# Patient Record
Sex: FEMALE | Race: WHITE | NOT HISPANIC OR LATINO
[De-identification: names, ages, dates, MRNs, and addresses within clinical notes are randomized per-mention and may not be internally consistent; named-entity substitution may affect disease eponyms.]

---

## 2017-06-28 ENCOUNTER — OTHER (OUTPATIENT)
Age: 37
End: 2017-06-28

## 2017-06-28 ENCOUNTER — APPOINTMENT (OUTPATIENT)
Dept: INTERNAL MEDICINE | Facility: CLINIC | Age: 37
End: 2017-06-28

## 2017-06-28 VITALS
WEIGHT: 106 LBS | HEART RATE: 57 BPM | SYSTOLIC BLOOD PRESSURE: 90 MMHG | BODY MASS INDEX: 18.32 KG/M2 | HEIGHT: 63.75 IN | OXYGEN SATURATION: 100 % | DIASTOLIC BLOOD PRESSURE: 60 MMHG | TEMPERATURE: 98.4 F

## 2017-06-28 DIAGNOSIS — Z87.09 PERSONAL HISTORY OF OTHER DISEASES OF THE RESPIRATORY SYSTEM: ICD-10-CM

## 2017-06-28 DIAGNOSIS — Z56.0 UNEMPLOYMENT, UNSPECIFIED: ICD-10-CM

## 2017-06-28 DIAGNOSIS — M54.5 LOW BACK PAIN: ICD-10-CM

## 2017-06-28 DIAGNOSIS — G89.29 LOW BACK PAIN: ICD-10-CM

## 2017-06-28 DIAGNOSIS — R10.30 LOWER ABDOMINAL PAIN, UNSPECIFIED: ICD-10-CM

## 2017-06-28 DIAGNOSIS — Z98.82 BREAST IMPLANT STATUS: ICD-10-CM

## 2017-06-28 LAB
BILIRUB UR QL STRIP: NORMAL
GLUCOSE UR-MCNC: NORMAL
HCG UR QL: 0.2 EU/DL
HGB UR QL STRIP.AUTO: NORMAL
KETONES UR-MCNC: NORMAL
LEUKOCYTE ESTERASE UR QL STRIP: NORMAL
NITRITE UR QL STRIP: NORMAL
PH UR STRIP: 6
PROT UR STRIP-MCNC: NORMAL
SP GR UR STRIP: 1.02

## 2017-06-28 SDOH — ECONOMIC STABILITY - INCOME SECURITY: UNEMPLOYMENT, UNSPECIFIED: Z56.0

## 2017-06-30 LAB
25(OH)D3 SERPL-MCNC: 61.4 NG/ML
ALBUMIN SERPL ELPH-MCNC: 4.5 G/DL
ALP BLD-CCNC: 36 U/L
ALT SERPL-CCNC: 10 U/L
ANION GAP SERPL CALC-SCNC: 15 MMOL/L
APPEARANCE: CLEAR
AST SERPL-CCNC: 14 U/L
BACTERIA UR CULT: NORMAL
BACTERIA: NEGATIVE
BASOPHILS # BLD AUTO: 0.01 K/UL
BASOPHILS NFR BLD AUTO: 0.2 %
BILIRUB SERPL-MCNC: 0.4 MG/DL
BILIRUBIN URINE: NEGATIVE
BLOOD URINE: ABNORMAL
BUN SERPL-MCNC: 18 MG/DL
CALCIUM SERPL-MCNC: 9.6 MG/DL
CHLORIDE SERPL-SCNC: 103 MMOL/L
CHOLEST SERPL-MCNC: 200 MG/DL
CHOLEST/HDLC SERPL: 2.2 RATIO
CO2 SERPL-SCNC: 22 MMOL/L
COLOR: YELLOW
CREAT SERPL-MCNC: 0.75 MG/DL
EOSINOPHIL # BLD AUTO: 0.05 K/UL
EOSINOPHIL NFR BLD AUTO: 0.9 %
GLUCOSE QUALITATIVE U: NORMAL MG/DL
GLUCOSE SERPL-MCNC: 76 MG/DL
HBA1C MFR BLD HPLC: 5 %
HCT VFR BLD CALC: 41.2 %
HDLC SERPL-MCNC: 93 MG/DL
HGB BLD-MCNC: 12.8 G/DL
HYALINE CASTS: 0 /LPF
IMM GRANULOCYTES NFR BLD AUTO: 0.2 %
IRON SERPL-MCNC: 80 UG/DL
KETONES URINE: NEGATIVE
LDLC SERPL CALC-MCNC: 96 MG/DL
LEUKOCYTE ESTERASE URINE: NEGATIVE
LYMPHOCYTES # BLD AUTO: 1.33 K/UL
LYMPHOCYTES NFR BLD AUTO: 24.6 %
MAN DIFF?: NORMAL
MCHC RBC-ENTMCNC: 30.3 PG
MCHC RBC-ENTMCNC: 31.1 GM/DL
MCV RBC AUTO: 97.6 FL
MICROSCOPIC-UA: NORMAL
MONOCYTES # BLD AUTO: 0.43 K/UL
MONOCYTES NFR BLD AUTO: 7.9 %
NEUTROPHILS # BLD AUTO: 3.58 K/UL
NEUTROPHILS NFR BLD AUTO: 66.2 %
NITRITE URINE: NEGATIVE
PH URINE: 6
PLATELET # BLD AUTO: 267 K/UL
POTASSIUM SERPL-SCNC: 4.4 MMOL/L
PROT SERPL-MCNC: 7.4 G/DL
PROTEIN URINE: NEGATIVE MG/DL
RBC # BLD: 4.22 M/UL
RBC # FLD: 13.6 %
RED BLOOD CELLS URINE: 5 /HPF
SODIUM SERPL-SCNC: 140 MMOL/L
SPECIFIC GRAVITY URINE: 1.02
SQUAMOUS EPITHELIAL CELLS: 2 /HPF
TRIGL SERPL-MCNC: 55 MG/DL
TSH SERPL-ACNC: 0.82 UIU/ML
URINE COMMENTS: NORMAL
UROBILINOGEN URINE: NORMAL MG/DL
WBC # FLD AUTO: 5.41 K/UL
WHITE BLOOD CELLS URINE: 0 /HPF

## 2017-07-13 ENCOUNTER — APPOINTMENT (OUTPATIENT)
Dept: ORTHOPEDIC SURGERY | Facility: CLINIC | Age: 37
End: 2017-07-13

## 2017-07-13 VITALS
BODY MASS INDEX: 18.38 KG/M2 | HEART RATE: 66 BPM | SYSTOLIC BLOOD PRESSURE: 113 MMHG | WEIGHT: 105 LBS | HEIGHT: 63.5 IN | DIASTOLIC BLOOD PRESSURE: 71 MMHG

## 2017-07-13 DIAGNOSIS — M54.5 LOW BACK PAIN: ICD-10-CM

## 2017-07-25 ENCOUNTER — FORM ENCOUNTER (OUTPATIENT)
Age: 37
End: 2017-07-25

## 2017-07-26 ENCOUNTER — APPOINTMENT (OUTPATIENT)
Dept: MRI IMAGING | Facility: CLINIC | Age: 37
End: 2017-07-26

## 2017-07-26 ENCOUNTER — OUTPATIENT (OUTPATIENT)
Dept: OUTPATIENT SERVICES | Facility: HOSPITAL | Age: 37
LOS: 1 days | End: 2017-07-26
Payer: COMMERCIAL

## 2017-07-26 DIAGNOSIS — R42 DIZZINESS AND GIDDINESS: ICD-10-CM

## 2017-07-26 PROCEDURE — 70551 MRI BRAIN STEM W/O DYE: CPT

## 2017-08-17 ENCOUNTER — APPOINTMENT (OUTPATIENT)
Dept: MRI IMAGING | Facility: IMAGING CENTER | Age: 37
End: 2017-08-17

## 2017-10-05 ENCOUNTER — OTHER (OUTPATIENT)
Age: 37
End: 2017-10-05

## 2017-10-05 DIAGNOSIS — M79.89 OTHER SPECIFIED SOFT TISSUE DISORDERS: ICD-10-CM

## 2017-10-24 ENCOUNTER — APPOINTMENT (OUTPATIENT)
Dept: INTERNAL MEDICINE | Facility: CLINIC | Age: 37
End: 2017-10-24
Payer: COMMERCIAL

## 2017-10-24 VITALS
OXYGEN SATURATION: 98 % | DIASTOLIC BLOOD PRESSURE: 64 MMHG | BODY MASS INDEX: 18.9 KG/M2 | TEMPERATURE: 98.2 F | WEIGHT: 108 LBS | HEART RATE: 63 BPM | SYSTOLIC BLOOD PRESSURE: 104 MMHG | HEIGHT: 63.5 IN

## 2017-10-24 LAB — S PYO AG SPEC QL IA: NORMAL

## 2017-10-24 PROCEDURE — 87880 STREP A ASSAY W/OPTIC: CPT | Mod: QW

## 2017-10-24 PROCEDURE — 99213 OFFICE O/P EST LOW 20 MIN: CPT

## 2018-03-27 ENCOUNTER — APPOINTMENT (OUTPATIENT)
Dept: INTERNAL MEDICINE | Facility: CLINIC | Age: 38
End: 2018-03-27
Payer: COMMERCIAL

## 2018-03-27 VITALS
SYSTOLIC BLOOD PRESSURE: 90 MMHG | OXYGEN SATURATION: 98 % | TEMPERATURE: 99.1 F | WEIGHT: 108 LBS | BODY MASS INDEX: 19.14 KG/M2 | HEART RATE: 85 BPM | HEIGHT: 63 IN | DIASTOLIC BLOOD PRESSURE: 54 MMHG

## 2018-03-27 DIAGNOSIS — H92.03 OTALGIA, BILATERAL: ICD-10-CM

## 2018-03-27 DIAGNOSIS — Z87.09 PERSONAL HISTORY OF OTHER DISEASES OF THE RESPIRATORY SYSTEM: ICD-10-CM

## 2018-03-27 DIAGNOSIS — J03.90 ACUTE TONSILLITIS, UNSPECIFIED: ICD-10-CM

## 2018-03-27 LAB — S PYO AG SPEC QL IA: NORMAL

## 2018-03-27 PROCEDURE — 87880 STREP A ASSAY W/OPTIC: CPT | Mod: QW

## 2018-03-27 PROCEDURE — 99213 OFFICE O/P EST LOW 20 MIN: CPT

## 2018-03-27 RX ORDER — AZITHROMYCIN 250 MG/1
250 TABLET, FILM COATED ORAL
Qty: 1 | Refills: 0 | Status: DISCONTINUED | COMMUNITY
Start: 2017-10-24 | End: 2018-03-27

## 2018-05-29 ENCOUNTER — OUTPATIENT (OUTPATIENT)
Dept: OUTPATIENT SERVICES | Facility: HOSPITAL | Age: 38
LOS: 1 days | End: 2018-05-29
Payer: COMMERCIAL

## 2018-05-29 DIAGNOSIS — Z3A.00 WEEKS OF GESTATION OF PREGNANCY NOT SPECIFIED: ICD-10-CM

## 2018-05-29 DIAGNOSIS — O26.899 OTHER SPECIFIED PREGNANCY RELATED CONDITIONS, UNSPECIFIED TRIMESTER: ICD-10-CM

## 2018-05-29 LAB
APPEARANCE UR: CLEAR — SIGNIFICANT CHANGE UP
BACTERIA # UR AUTO: SIGNIFICANT CHANGE UP
BILIRUB UR-MCNC: NEGATIVE — SIGNIFICANT CHANGE UP
BLOOD UR QL VISUAL: HIGH
COLOR SPEC: SIGNIFICANT CHANGE UP
GLUCOSE UR-MCNC: NEGATIVE — SIGNIFICANT CHANGE UP
KETONES UR-MCNC: NEGATIVE — SIGNIFICANT CHANGE UP
LEUKOCYTE ESTERASE UR-ACNC: HIGH
MUCOUS THREADS # UR AUTO: SIGNIFICANT CHANGE UP
NITRITE UR-MCNC: NEGATIVE — SIGNIFICANT CHANGE UP
NON-SQ EPI CELLS # UR AUTO: <1 — SIGNIFICANT CHANGE UP
PH UR: 7.5 — SIGNIFICANT CHANGE UP (ref 4.6–8)
PROT UR-MCNC: NEGATIVE MG/DL — SIGNIFICANT CHANGE UP
RBC CASTS # UR COMP ASSIST: SIGNIFICANT CHANGE UP (ref 0–?)
SP GR SPEC: 1.01 — SIGNIFICANT CHANGE UP (ref 1–1.04)
SQUAMOUS # UR AUTO: SIGNIFICANT CHANGE UP
UROBILINOGEN FLD QL: NORMAL MG/DL — SIGNIFICANT CHANGE UP
WBC UR QL: SIGNIFICANT CHANGE UP (ref 0–?)

## 2018-05-29 PROCEDURE — 76815 OB US LIMITED FETUS(S): CPT | Mod: 26

## 2018-05-29 PROCEDURE — 76817 TRANSVAGINAL US OBSTETRIC: CPT | Mod: 26

## 2018-05-29 PROCEDURE — 99213 OFFICE O/P EST LOW 20 MIN: CPT | Mod: 25

## 2018-05-29 RX ORDER — NITROFURANTOIN MACROCRYSTAL 50 MG
1 CAPSULE ORAL
Qty: 14 | Refills: 0 | OUTPATIENT
Start: 2018-05-29 | End: 2018-06-04

## 2018-05-31 ENCOUNTER — TRANSCRIPTION ENCOUNTER (OUTPATIENT)
Age: 38
End: 2018-05-31

## 2018-05-31 ENCOUNTER — INPATIENT (INPATIENT)
Facility: HOSPITAL | Age: 38
LOS: 13 days | Discharge: HOME | End: 2018-06-14
Attending: OBSTETRICS & GYNECOLOGY | Admitting: OBSTETRICS & GYNECOLOGY
Payer: COMMERCIAL

## 2018-05-31 ENCOUNTER — OUTPATIENT (OUTPATIENT)
Dept: OUTPATIENT SERVICES | Facility: HOSPITAL | Age: 38
LOS: 1 days | End: 2018-05-31

## 2018-05-31 ENCOUNTER — APPOINTMENT (OUTPATIENT)
Dept: ANTEPARTUM | Facility: HOSPITAL | Age: 38
End: 2018-05-31
Payer: COMMERCIAL

## 2018-05-31 ENCOUNTER — ASOB RESULT (OUTPATIENT)
Age: 38
End: 2018-05-31

## 2018-05-31 VITALS
DIASTOLIC BLOOD PRESSURE: 62 MMHG | RESPIRATION RATE: 16 BRPM | SYSTOLIC BLOOD PRESSURE: 111 MMHG | HEART RATE: 101 BPM | TEMPERATURE: 99 F | OXYGEN SATURATION: 99 %

## 2018-05-31 VITALS
SYSTOLIC BLOOD PRESSURE: 110 MMHG | RESPIRATION RATE: 17 BRPM | OXYGEN SATURATION: 98 % | TEMPERATURE: 98 F | DIASTOLIC BLOOD PRESSURE: 56 MMHG | HEART RATE: 91 BPM

## 2018-05-31 VITALS — WEIGHT: 116.84 LBS | HEIGHT: 63 IN

## 2018-05-31 DIAGNOSIS — Z3A.00 WEEKS OF GESTATION OF PREGNANCY NOT SPECIFIED: ICD-10-CM

## 2018-05-31 DIAGNOSIS — O26.899 OTHER SPECIFIED PREGNANCY RELATED CONDITIONS, UNSPECIFIED TRIMESTER: ICD-10-CM

## 2018-05-31 DIAGNOSIS — O26.90 PREGNANCY RELATED CONDITIONS, UNSPECIFIED, UNSPECIFIED TRIMESTER: ICD-10-CM

## 2018-05-31 LAB
APPEARANCE UR: CLEAR — SIGNIFICANT CHANGE UP
B PERT DNA SPEC QL NAA+PROBE: SIGNIFICANT CHANGE UP
BACTERIA # UR AUTO: SIGNIFICANT CHANGE UP
BACTERIA UR CULT: SIGNIFICANT CHANGE UP
BASOPHILS # BLD AUTO: 0.01 K/UL — SIGNIFICANT CHANGE UP (ref 0–0.2)
BASOPHILS # BLD AUTO: 0.02 K/UL — SIGNIFICANT CHANGE UP (ref 0–0.2)
BASOPHILS NFR BLD AUTO: 0.1 % — SIGNIFICANT CHANGE UP (ref 0–2)
BASOPHILS NFR BLD AUTO: 0.2 % — SIGNIFICANT CHANGE UP (ref 0–2)
BILIRUB UR-MCNC: NEGATIVE — SIGNIFICANT CHANGE UP
BLD GP AB SCN SERPL QL: NEGATIVE — SIGNIFICANT CHANGE UP
BLOOD UR QL VISUAL: HIGH
C PNEUM DNA SPEC QL NAA+PROBE: NOT DETECTED — SIGNIFICANT CHANGE UP
COLOR SPEC: YELLOW — SIGNIFICANT CHANGE UP
EOSINOPHIL # BLD AUTO: 0.02 K/UL — SIGNIFICANT CHANGE UP (ref 0–0.5)
EOSINOPHIL # BLD AUTO: 0.02 K/UL — SIGNIFICANT CHANGE UP (ref 0–0.5)
EOSINOPHIL NFR BLD AUTO: 0.2 % — SIGNIFICANT CHANGE UP (ref 0–6)
EOSINOPHIL NFR BLD AUTO: 0.2 % — SIGNIFICANT CHANGE UP (ref 0–6)
FLUAV H1 2009 PAND RNA SPEC QL NAA+PROBE: NOT DETECTED — SIGNIFICANT CHANGE UP
FLUAV H1 RNA SPEC QL NAA+PROBE: NOT DETECTED — SIGNIFICANT CHANGE UP
FLUAV H3 RNA SPEC QL NAA+PROBE: NOT DETECTED — SIGNIFICANT CHANGE UP
FLUAV SUBTYP SPEC NAA+PROBE: SIGNIFICANT CHANGE UP
FLUBV RNA SPEC QL NAA+PROBE: NOT DETECTED — SIGNIFICANT CHANGE UP
GLUCOSE UR-MCNC: NEGATIVE — SIGNIFICANT CHANGE UP
HADV DNA SPEC QL NAA+PROBE: NOT DETECTED — SIGNIFICANT CHANGE UP
HCOV 229E RNA SPEC QL NAA+PROBE: NOT DETECTED — SIGNIFICANT CHANGE UP
HCOV HKU1 RNA SPEC QL NAA+PROBE: NOT DETECTED — SIGNIFICANT CHANGE UP
HCOV NL63 RNA SPEC QL NAA+PROBE: NOT DETECTED — SIGNIFICANT CHANGE UP
HCOV OC43 RNA SPEC QL NAA+PROBE: NOT DETECTED — SIGNIFICANT CHANGE UP
HCT VFR BLD CALC: 26.7 % — LOW (ref 34.5–45)
HCT VFR BLD CALC: 28.8 % — LOW (ref 34.5–45)
HGB BLD-MCNC: 9.4 G/DL — LOW (ref 11.5–15.5)
HGB BLD-MCNC: 9.9 G/DL — LOW (ref 11.5–15.5)
HMPV RNA SPEC QL NAA+PROBE: NOT DETECTED — SIGNIFICANT CHANGE UP
HPIV1 RNA SPEC QL NAA+PROBE: NOT DETECTED — SIGNIFICANT CHANGE UP
HPIV2 RNA SPEC QL NAA+PROBE: NOT DETECTED — SIGNIFICANT CHANGE UP
HPIV3 RNA SPEC QL NAA+PROBE: NOT DETECTED — SIGNIFICANT CHANGE UP
HPIV4 RNA SPEC QL NAA+PROBE: NOT DETECTED — SIGNIFICANT CHANGE UP
IMM GRANULOCYTES # BLD AUTO: 0.07 # — SIGNIFICANT CHANGE UP
IMM GRANULOCYTES # BLD AUTO: 0.08 # — SIGNIFICANT CHANGE UP
IMM GRANULOCYTES NFR BLD AUTO: 0.6 % — SIGNIFICANT CHANGE UP (ref 0–1.5)
IMM GRANULOCYTES NFR BLD AUTO: 0.6 % — SIGNIFICANT CHANGE UP (ref 0–1.5)
KETONES UR-MCNC: NEGATIVE — SIGNIFICANT CHANGE UP
LACTATE SERPL-SCNC: 0.6 MMOL/L — SIGNIFICANT CHANGE UP (ref 0.5–2)
LACTATE SERPL-SCNC: 0.7 MMOL/L — SIGNIFICANT CHANGE UP (ref 0.5–2)
LEUKOCYTE ESTERASE UR-ACNC: HIGH
LYMPHOCYTES # BLD AUTO: 0.53 K/UL — LOW (ref 1–3.3)
LYMPHOCYTES # BLD AUTO: 0.68 K/UL — LOW (ref 1–3.3)
LYMPHOCYTES # BLD AUTO: 4.5 % — LOW (ref 13–44)
LYMPHOCYTES # BLD AUTO: 5.1 % — LOW (ref 13–44)
M PNEUMO DNA SPEC QL NAA+PROBE: NOT DETECTED — SIGNIFICANT CHANGE UP
MCHC RBC-ENTMCNC: 30.6 PG — SIGNIFICANT CHANGE UP (ref 27–34)
MCHC RBC-ENTMCNC: 31.2 PG — SIGNIFICANT CHANGE UP (ref 27–34)
MCHC RBC-ENTMCNC: 34.4 % — SIGNIFICANT CHANGE UP (ref 32–36)
MCHC RBC-ENTMCNC: 35.2 % — SIGNIFICANT CHANGE UP (ref 32–36)
MCV RBC AUTO: 87 FL — SIGNIFICANT CHANGE UP (ref 80–100)
MCV RBC AUTO: 90.9 FL — SIGNIFICANT CHANGE UP (ref 80–100)
MONOCYTES # BLD AUTO: 0.73 K/UL — SIGNIFICANT CHANGE UP (ref 0–0.9)
MONOCYTES # BLD AUTO: 0.77 K/UL — SIGNIFICANT CHANGE UP (ref 0–0.9)
MONOCYTES NFR BLD AUTO: 5.8 % — SIGNIFICANT CHANGE UP (ref 2–14)
MONOCYTES NFR BLD AUTO: 6.2 % — SIGNIFICANT CHANGE UP (ref 2–14)
MUCOUS THREADS # UR AUTO: SIGNIFICANT CHANGE UP
NEUTROPHILS # BLD AUTO: 10.47 K/UL — HIGH (ref 1.8–7.4)
NEUTROPHILS # BLD AUTO: 11.64 K/UL — HIGH (ref 1.8–7.4)
NEUTROPHILS NFR BLD AUTO: 88.1 % — HIGH (ref 43–77)
NEUTROPHILS NFR BLD AUTO: 88.4 % — HIGH (ref 43–77)
NITRITE UR-MCNC: NEGATIVE — SIGNIFICANT CHANGE UP
NON-SQ EPI CELLS # UR AUTO: <1 — SIGNIFICANT CHANGE UP
NRBC # FLD: 0 — SIGNIFICANT CHANGE UP
NRBC # FLD: 0 — SIGNIFICANT CHANGE UP
PH UR: 7 — SIGNIFICANT CHANGE UP (ref 4.6–8)
PLATELET # BLD AUTO: 186 K/UL — SIGNIFICANT CHANGE UP (ref 150–400)
PLATELET # BLD AUTO: 194 K/UL — SIGNIFICANT CHANGE UP (ref 150–400)
PMV BLD: 10 FL — SIGNIFICANT CHANGE UP (ref 7–13)
PMV BLD: 10.2 FL — SIGNIFICANT CHANGE UP (ref 7–13)
PROT UR-MCNC: NEGATIVE MG/DL — SIGNIFICANT CHANGE UP
RBC # BLD: 3.07 M/UL — LOW (ref 3.8–5.2)
RBC # BLD: 3.17 M/UL — LOW (ref 3.8–5.2)
RBC # FLD: 11.9 % — SIGNIFICANT CHANGE UP (ref 10.3–14.5)
RBC # FLD: 12.2 % — SIGNIFICANT CHANGE UP (ref 10.3–14.5)
RBC CASTS # UR COMP ASSIST: HIGH (ref 0–?)
RH IG SCN BLD-IMP: POSITIVE — SIGNIFICANT CHANGE UP
RSV RNA SPEC QL NAA+PROBE: NOT DETECTED — SIGNIFICANT CHANGE UP
RV+EV RNA SPEC QL NAA+PROBE: NOT DETECTED — SIGNIFICANT CHANGE UP
SP GR SPEC: 1.01 — SIGNIFICANT CHANGE UP (ref 1–1.04)
SPECIMEN SOURCE: SIGNIFICANT CHANGE UP
SQUAMOUS # UR AUTO: SIGNIFICANT CHANGE UP
UROBILINOGEN FLD QL: NORMAL MG/DL — SIGNIFICANT CHANGE UP
WBC # BLD: 11.83 K/UL — HIGH (ref 3.8–10.5)
WBC # BLD: 13.21 K/UL — HIGH (ref 3.8–10.5)
WBC # FLD AUTO: 11.83 K/UL — HIGH (ref 3.8–10.5)
WBC # FLD AUTO: 13.21 K/UL — HIGH (ref 3.8–10.5)
WBC UR QL: HIGH (ref 0–?)

## 2018-05-31 PROCEDURE — 76817 TRANSVAGINAL US OBSTETRIC: CPT | Mod: 26

## 2018-05-31 PROCEDURE — 71045 X-RAY EXAM CHEST 1 VIEW: CPT | Mod: 26

## 2018-05-31 PROCEDURE — 76805 OB US >/= 14 WKS SNGL FETUS: CPT | Mod: 26

## 2018-05-31 RX ORDER — DOCUSATE SODIUM 100 MG
100 CAPSULE ORAL
Qty: 0 | Refills: 0 | Status: DISCONTINUED | OUTPATIENT
Start: 2018-05-31 | End: 2018-05-31

## 2018-05-31 RX ORDER — AMOXICILLIN 250 MG/5ML
1 SUSPENSION, RECONSTITUTED, ORAL (ML) ORAL
Qty: 14 | Refills: 0 | OUTPATIENT
Start: 2018-05-31 | End: 2018-06-06

## 2018-05-31 RX ORDER — METHENAMINE MANDELATE 1 G
0 TABLET ORAL
Qty: 0 | Refills: 0 | COMMUNITY
Start: 2018-05-31

## 2018-05-31 RX ORDER — AMPICILLIN TRIHYDRATE 250 MG
2 CAPSULE ORAL EVERY 4 HOURS
Qty: 0 | Refills: 0 | Status: DISCONTINUED | OUTPATIENT
Start: 2018-05-31 | End: 2018-05-31

## 2018-05-31 RX ORDER — SODIUM CHLORIDE 9 MG/ML
1000 INJECTION, SOLUTION INTRAVENOUS ONCE
Qty: 0 | Refills: 0 | Status: COMPLETED | OUTPATIENT
Start: 2018-05-31 | End: 2018-05-31

## 2018-05-31 RX ORDER — PIPERACILLIN AND TAZOBACTAM 4; .5 G/20ML; G/20ML
3.38 INJECTION, POWDER, LYOPHILIZED, FOR SOLUTION INTRAVENOUS ONCE
Qty: 0 | Refills: 0 | Status: COMPLETED | OUTPATIENT
Start: 2018-05-31 | End: 2018-05-31

## 2018-05-31 RX ORDER — SODIUM CHLORIDE 9 MG/ML
1000 INJECTION, SOLUTION INTRAVENOUS
Qty: 0 | Refills: 0 | Status: DISCONTINUED | OUTPATIENT
Start: 2018-05-31 | End: 2018-05-31

## 2018-05-31 RX ORDER — ACETAMINOPHEN 500 MG
650 TABLET ORAL EVERY 6 HOURS
Qty: 0 | Refills: 0 | Status: DISCONTINUED | OUTPATIENT
Start: 2018-05-31 | End: 2018-05-31

## 2018-05-31 RX ORDER — PIPERACILLIN AND TAZOBACTAM 4; .5 G/20ML; G/20ML
3.38 INJECTION, POWDER, LYOPHILIZED, FOR SOLUTION INTRAVENOUS EVERY 8 HOURS
Qty: 0 | Refills: 0 | Status: DISCONTINUED | OUTPATIENT
Start: 2018-05-31 | End: 2018-06-13

## 2018-05-31 RX ADMIN — Medication 650 MILLIGRAM(S): at 10:04

## 2018-05-31 RX ADMIN — Medication 650 MILLIGRAM(S): at 03:11

## 2018-05-31 RX ADMIN — Medication 650 MILLIGRAM(S): at 03:41

## 2018-05-31 RX ADMIN — Medication 216 GRAM(S): at 06:44

## 2018-05-31 RX ADMIN — Medication 216 GRAM(S): at 02:55

## 2018-05-31 RX ADMIN — Medication 650 MILLIGRAM(S): at 10:30

## 2018-05-31 RX ADMIN — PIPERACILLIN AND TAZOBACTAM 25 GRAM(S): 4; .5 INJECTION, POWDER, LYOPHILIZED, FOR SOLUTION INTRAVENOUS at 20:00

## 2018-05-31 RX ADMIN — Medication 216 GRAM(S): at 11:18

## 2018-05-31 RX ADMIN — SODIUM CHLORIDE 250 MILLILITER(S): 9 INJECTION, SOLUTION INTRAVENOUS at 02:57

## 2018-05-31 RX ADMIN — SODIUM CHLORIDE 2000 MILLILITER(S): 9 INJECTION, SOLUTION INTRAVENOUS at 20:00

## 2018-05-31 NOTE — DISCHARGE NOTE ANTEPARTUM - CARE PROVIDER_API CALL
Pascual Roman (AJ), MaternalFetal Medicine; Obstetrics and Gynecology  [address] [address] [place] [address]  [address]  [place] [address]  Phone: [phone]  Fax: [phone]

## 2018-05-31 NOTE — DISCHARGE NOTE ANTEPARTUM - PATIENT PORTAL LINK FT
You can access the [facility] Patient Portal, offered by [facility], by registering with the following website: [email or web address]

## 2018-05-31 NOTE — DISCHARGE NOTE ANTEPARTUM - HOSPITAL COURSE
36 yo  at 17w1d a/w fevers at home. Patient has hx of sepsis in previous pregnancies. She was not complaining of any other symptoms. She was afebrile throughout admission. Received ampicillin for antibiotic coverage. She was discharged home on HD#2 with followup as outpatient. CL was 2.7 on ATU sono. She will receive Gerald[place] weekly. Followup with OB as scheduled.

## 2018-05-31 NOTE — DISCHARGE NOTE ANTEPARTUM - MEDICATION SUMMARY - MEDICATIONS TO TAKE
I will START or STAY ON the medications listed below when I get home from the hospital:  None I will START or STAY ON the medications listed below when I get home from the hospital:    amoxicillin 875 mg oral tablet  -- 1 tab(s) by mouth every 12 hours   -- Finish all this medication unless otherwise directed by prescriber.    -- Indication: For Fever of unknown origin (FUO)

## 2018-05-31 NOTE — DISCHARGE NOTE ANTEPARTUM - PLAN OF CARE
Continue prenatal care Followup with OB as scheduled. Continue [place] weekly. Call office with any concerns.

## 2018-06-01 ENCOUNTER — TRANSCRIPTION ENCOUNTER (OUTPATIENT)
Age: 38
End: 2018-06-01

## 2018-06-01 LAB
AMNISURE ROM (RUPTURE OF MEMBRANES): NEGATIVE — SIGNIFICANT CHANGE UP
BACTERIA UR CULT: SIGNIFICANT CHANGE UP
BASOPHILS # BLD AUTO: 0.02 K/UL — SIGNIFICANT CHANGE UP (ref 0–0.2)
BASOPHILS NFR BLD AUTO: 0.2 % — SIGNIFICANT CHANGE UP (ref 0–2)
EOSINOPHIL # BLD AUTO: 0.02 K/UL — SIGNIFICANT CHANGE UP (ref 0–0.5)
EOSINOPHIL NFR BLD AUTO: 0.2 % — SIGNIFICANT CHANGE UP (ref 0–6)
HCT VFR BLD CALC: 25.8 % — LOW (ref 34.5–45)
HGB BLD-MCNC: 8.9 G/DL — LOW (ref 11.5–15.5)
IMM GRANULOCYTES # BLD AUTO: 0.06 # — SIGNIFICANT CHANGE UP
IMM GRANULOCYTES NFR BLD AUTO: 0.6 % — SIGNIFICANT CHANGE UP (ref 0–1.5)
LYMPHOCYTES # BLD AUTO: 0.76 K/UL — LOW (ref 1–3.3)
LYMPHOCYTES # BLD AUTO: 7.7 % — LOW (ref 13–44)
MCHC RBC-ENTMCNC: 30.5 PG — SIGNIFICANT CHANGE UP (ref 27–34)
MCHC RBC-ENTMCNC: 34.5 % — SIGNIFICANT CHANGE UP (ref 32–36)
MCV RBC AUTO: 88.4 FL — SIGNIFICANT CHANGE UP (ref 80–100)
METHOD TYPE: SIGNIFICANT CHANGE UP
MONOCYTES # BLD AUTO: 0.74 K/UL — SIGNIFICANT CHANGE UP (ref 0–0.9)
MONOCYTES NFR BLD AUTO: 7.5 % — SIGNIFICANT CHANGE UP (ref 2–14)
NEUTROPHILS # BLD AUTO: 8.22 K/UL — HIGH (ref 1.8–7.4)
NEUTROPHILS NFR BLD AUTO: 83.8 % — HIGH (ref 43–77)
NRBC # FLD: 0 — SIGNIFICANT CHANGE UP
ORGANISM # SPEC MICROSCOPIC CNT: SIGNIFICANT CHANGE UP
ORGANISM # SPEC MICROSCOPIC CNT: SIGNIFICANT CHANGE UP
PLATELET # BLD AUTO: 150 K/UL — SIGNIFICANT CHANGE UP (ref 150–400)
PMV BLD: 10.2 FL — SIGNIFICANT CHANGE UP (ref 7–13)
RBC # BLD: 2.92 M/UL — LOW (ref 3.8–5.2)
RBC # FLD: 12.2 % — SIGNIFICANT CHANGE UP (ref 10.3–14.5)
SPECIMEN SOURCE: SIGNIFICANT CHANGE UP
WBC # BLD: 9.82 K/UL — SIGNIFICANT CHANGE UP (ref 3.8–10.5)
WBC # FLD AUTO: 9.82 K/UL — SIGNIFICANT CHANGE UP (ref 3.8–10.5)

## 2018-06-01 PROCEDURE — 99254 IP/OBS CNSLTJ NEW/EST MOD 60: CPT | Mod: GC

## 2018-06-01 RX ORDER — ALPRAZOLAM 0.25 MG
0.25 TABLET ORAL ONCE
Qty: 0 | Refills: 0 | Status: DISCONTINUED | OUTPATIENT
Start: 2018-06-01 | End: 2018-06-02

## 2018-06-01 RX ORDER — ACETAMINOPHEN 500 MG
975 TABLET ORAL ONCE
Qty: 0 | Refills: 0 | Status: COMPLETED | OUTPATIENT
Start: 2018-06-01 | End: 2018-06-01

## 2018-06-01 RX ORDER — DOCUSATE SODIUM 100 MG
100 CAPSULE ORAL
Qty: 0 | Refills: 0 | Status: DISCONTINUED | OUTPATIENT
Start: 2018-06-01 | End: 2018-06-14

## 2018-06-01 RX ADMIN — PIPERACILLIN AND TAZOBACTAM 25 GRAM(S): 4; .5 INJECTION, POWDER, LYOPHILIZED, FOR SOLUTION INTRAVENOUS at 14:31

## 2018-06-01 RX ADMIN — PIPERACILLIN AND TAZOBACTAM 25 GRAM(S): 4; .5 INJECTION, POWDER, LYOPHILIZED, FOR SOLUTION INTRAVENOUS at 04:14

## 2018-06-01 RX ADMIN — Medication 1 TABLET(S): at 12:08

## 2018-06-01 RX ADMIN — Medication 975 MILLIGRAM(S): at 02:06

## 2018-06-01 RX ADMIN — Medication 975 MILLIGRAM(S): at 01:36

## 2018-06-01 RX ADMIN — PIPERACILLIN AND TAZOBACTAM 25 GRAM(S): 4; .5 INJECTION, POWDER, LYOPHILIZED, FOR SOLUTION INTRAVENOUS at 21:57

## 2018-06-01 NOTE — CONSULT NOTE ADULT - SUBJECTIVE AND OBJECTIVE BOX
Consultation Requested by:    Patient is a 37y old  Female who presents with a chief complaint of fevers in pregnancy (2018 14:58)    HPI: 37/F  currently 17 weeks pregnant comes to [facility] complaining of fever, lower abdominal cramping, and pink vaginal discharge that previously was yellow. She has a complicated history with her first pregnancy in  ending in fetal demise after  birth and she was found to be bacteremic with GBS and required vancomycin via PICC x 2 weeks. She became pregnant again  and successfully gave birth to  child however was found to be bacteremic with bacteroides and was treated with meropenem. She again was pregnant  via IVF and had baby girl with no complications other than  birth. She is currently 17 weeks pregnant with another baby girl and has been having intermittent fevers at 11:00/6:00 regularly up to 102F. She had some to [facility] Tuesday but was discharged home with amoxicillin. Prior to that she was on macrobid for UTI. She returned to hospital as she continued to have fevers. Patient takes colace and prune juice for constipation. She had been told in the past she has severe constipation that causes her abdominal pain during pregnancy. She has no other medical problems. She had colonoscopy 2.5 years ago with removal of 3 polyps. She currently takes progesterone injections for cervical insufficiency but has no skin infections at injection site.     REVIEW OF SYSTEMS  General: no fever, no chills  Ophthalmologic: no change in vision  ENMT: no sore throat  Respiratory and Thorax: no SOB, no cough  Cardiovascular: no CP  Gastrointestinal: no abd pain, N/V/D  Genitourinary: lower abd cramps  Musculoskeletal: no joint pain  Neurological: no HA  Psychiatric: no anxiety/depression  Hematology/Lymphatics: no abnormal bleeding  Endocrine: no changes in weight    Recent Ill Contacts:	[x] No	[] Yes:  Recent Travel History:	[x] No	[] Yes:  Recent Animal/Insect Exposure/Tick Bites:	[x] No	[] Yes:    Allergies    Kefzol (Hives; Rash; Chills)    Intolerances      Antimicrobials:  piperacillin/tazobactam IVPB. 3.375 Gram(s) IV Intermittent every 8 hours      Other Medications:  docusate sodium 100 milliGRAM(s) Oral two times a day  prenatal multivitamin 1 Tablet(s) Oral daily      FAMILY HISTORY:    PAST MEDICAL & SURGICAL HISTORY:    SOCIAL HISTORY:    IMMUNIZATIONS  [] Up to Date		[] Not Up to Date:  Recent Immunizations:	[] No	[] Yes:        PHYSICAL EXAM  Vital Signs Last 24 Hrs  T(C): 37.3 (2018 12:12), Max: 37.7 (2018 00:30)  T(F): 99.1 (2018 12:12), Max: 99.8 (2018 00:30)  HR: 80 (2018 12:12) (63 - 92)  BP: 107/65 (2018 12:12) (90/55 - 110/62)  BP(mean): --  RR: 18 (2018 12:12) (16 - 18)  SpO2: 99% (2018 12:12) (96% - 100%)    Constitutional: no acute distress  Eyes: clear conjunctiva  ENMT: moist oral mucosa  Respiratory: CTA b/l  Cardiovascular: S1, S2, RR  Gastrointestinal: abd distended 2/2 pregnancy. Non tender.   Genitourinary: no CVA tenderness. Declined further exam.   Extremities: no calf tenderness, no leg edema  Neurological: AAO x 4  Skin: no rashes        Respiratory Support:		[x] No	[] Yes:  Vasoactive medication infusion:	[x] No	[] Yes:  Venous catheters:		[] No	[x] Yes:  Bladder catheter:		[x] No	[] Yes:  Other catheters or tubes:	[x] No	[] Yes:    Lab Results:                        8.9    9.82  )-----------( 150      ( 2018 06:09 )             25.8               Urinalysis Basic - ( 31 May 2018 01:36 )    Color: YELLOW / Appearance: CLEAR / S.007 / pH: 7.0  Gluc: NEGATIVE / Ketone: NEGATIVE  / Bili: NEGATIVE / Urobili: NORMAL mg/dL   Blood: MODERATE / Protein: NEGATIVE mg/dL / Nitrite: NEGATIVE   Leuk Esterase: MODERATE / RBC: 5-10 / WBC 5-10   Sq Epi: OCC / Non Sq Epi: x / Bacteria: FEW        MICROBIOLOGY    [] Pathology slides reviewed and/or discussed with pathologist  [] Microbiology findings discussed with microbiologist or slides reviewed  [] Images erviewed with radiologist  [] Case discussed with an attending physician in addition to the patient's primary physician  [] Records, reports from outside [facility] reviewed    [] Patient requires continued monitoring for:  [] Total critical care time spent by attending physician: __ minutes, excluding procedure time. Consultation Requested by:    Patient is a 37y old  Female who presents with a chief complaint of fevers in pregnancy (2018 14:58)    HPI: 37/F  currently 17 weeks pregnant comes to [facility] complaining of fever, lower abdominal cramping, and pink vaginal discharge that previously was yellow. She has a complicated history with her first pregnancy in  ending in fetal demise after  birth and she was found to be bacteremic with GBS and required vancomycin via PICC x 2 weeks. She became pregnant again  and successfully gave birth to  child however was found to be bacteremic with bacteroides and was treated with meropenem. She again was pregnant  via IVF and had baby girl with no complications other than  birth. She is currently 17 weeks pregnant with another baby girl and has been having intermittent fevers at 11:00/6:00 regularly up to 102F. She had some to [facility] Tuesday but was discharged home with amoxicillin. Prior to that she was on macrobid for UTI. She returned to hospital as she continued to have fevers. Patient takes colace and prune juice for constipation. She had been told in the past she has severe constipation that causes her abdominal pain during pregnancy. She has no other medical problems. She had colonoscopy 2.5 years ago with removal of 3 polyps. She currently takes progesterone injections for cervical insufficiency but has no skin infections at injection site.     REVIEW OF SYSTEMS  General: no fever, no chills  Ophthalmologic: no change in vision  ENMT: no sore throat  Respiratory and Thorax: no SOB, no cough  Cardiovascular: no CP  Gastrointestinal: no abd pain, N/V/D  Genitourinary: lower abd cramps  Musculoskeletal: no joint pain  Neurological: no HA  Psychiatric: no anxiety/depression  Hematology/Lymphatics: no abnormal bleeding  Endocrine: no changes in weight    Recent Ill Contacts:	[x] No	[] Yes:  Recent Travel History:	[x] No	[] Yes:  Recent Animal/Insect Exposure/Tick Bites:	[x] No	[] Yes:    Allergies    Kefzol (Hives; Rash; Chills)    Intolerances      Antimicrobials:  piperacillin/tazobactam IVPB. 3.375 Gram(s) IV Intermittent every 8 hours      Other Medications:  docusate sodium 100 milliGRAM(s) Oral two times a day  prenatal multivitamin 1 Tablet(s) Oral daily      FAMILY HISTORY:    PAST MEDICAL & SURGICAL HISTORY:    SOCIAL HISTORY:    IMMUNIZATIONS  [] Up to Date		[] Not Up to Date:  Recent Immunizations:	[] No	[] Yes:        PHYSICAL EXAM  Vital Signs Last 24 Hrs  T(C): 37.3 (2018 12:12), Max: 37.7 (2018 00:30)  T(F): 99.1 (2018 12:12), Max: 99.8 (2018 00:30)  HR: 80 (2018 12:12) (63 - 92)  BP: 107/65 (2018 12:12) (90/55 - 110/62)  BP(mean): --  RR: 18 (2018 12:12) (16 - 18)  SpO2: 99% (2018 12:12) (96% - 100%)    Constitutional: no acute distress  Eyes: clear conjunctiva  ENMT: moist oral mucosa  Respiratory: CTA b/l  Cardiovascular: S1, S2, RR  Gastrointestinal: abd distended 2/2 pregnancy. Non tender.   Genitourinary: no CVA tenderness. Declined further exam.   Extremities: no calf tenderness, no leg edema  Neurological: AAO x 4  Skin: no rashes        Respiratory Support:		[x] No	[] Yes:  Vasoactive medication infusion:	[x] No	[] Yes:  Venous catheters:		[] No	[x] Yes:  Bladder catheter:		[x] No	[] Yes:  Other catheters or tubes:	[x] No	[] Yes:    Lab Results:                        8.9    9.82  )-----------( 150      ( 2018 06:09 )             25.8               Urinalysis Basic - ( 31 May 2018 01:36 )    Color: YELLOW / Appearance: CLEAR / S.007 / pH: 7.0  Gluc: NEGATIVE / Ketone: NEGATIVE  / Bili: NEGATIVE / Urobili: NORMAL mg/dL   Blood: MODERATE / Protein: NEGATIVE mg/dL / Nitrite: NEGATIVE   Leuk Esterase: MODERATE / RBC: 5-10 / WBC 5-10   Sq Epi: OCC / Non Sq Epi: x / Bacteria: FEW        MICROBIOLOGY    RVP neg  blood culture x 2 testing  urine culture neg x 2

## 2018-06-01 NOTE — DISCHARGE NOTE ANTEPARTUM - CARE PROVIDER_API CALL
Pascual Roman (AJ), MaternalFetal Medicine; Obstetrics and Gynecology  [address] [address] [place] [address]  [address]  [place] [address]  Phone: [phone]  Fax: [phone]

## 2018-06-01 NOTE — CONSULT NOTE ADULT - ATTENDING COMMENTS
Concern for chorioamnionitis in woman  who is 17 weeks pregnant with fever, lower abdominal crampy pain.    Blood culture +   await gram stain, identification.    would continue zosyn  follow blood culture identification and susceptibility    ID service will follow over weekend

## 2018-06-01 NOTE — DISCHARGE NOTE ANTEPARTUM - CARE PLAN
Principal Discharge DX:	Fever and chills  Goal:	Continue prenatal care  Assessment and plan of treatment:	F/u as outpatient with OB. Return for fevers, contraction, LOF. Take [place] weekly as prescribed. Principal Discharge DX:	Fever and chills  Goal:	Continue prenatal care  Assessment and plan of treatment:	F/u as outpatient with OB and Infectious disease. Return for fevers, contraction, LOF. Take [place] weekly as prescribed.

## 2018-06-01 NOTE — DISCHARGE NOTE ANTEPARTUM - PATIENT PORTAL LINK FT
You can access the [facility] Patient Portal, offered by [facility], by registering with the following website: [email or web address]

## 2018-06-01 NOTE — DISCHARGE NOTE ANTEPARTUM - MEDICATION SUMMARY - MEDICATIONS TO TAKE
I will START or STAY ON the medications listed below when I get home from the hospital:    amoxicillin 500 mg oral tablet  -- 1 tab(s) by mouth every 12 hours   -- Finish all this medication unless otherwise directed by prescriber.    -- Indication: For Infection    HYDROXYprogesterone 250 mg/mL intramuscular solution  -- 1 milliliter(s) intramuscular every 7 days  -- Indication: For Vag P

## 2018-06-01 NOTE — DISCHARGE NOTE ANTEPARTUM - PLAN OF CARE
Continue prenatal care F/u as outpatient with OB. Return for fevers, contraction, LOF. Take [place] weekly as prescribed. F/u as outpatient with OB and Infectious disease. Return for fevers, contraction, LOF. Take [place] weekly as prescribed.

## 2018-06-01 NOTE — CONSULT NOTE ADULT - ASSESSMENT
37/F  currently 17 weeks pregnant comes to [facility] with fevers with infectious work up negative thus far other than UA concerning for possible UTI however negative urine cultures, RVP, blood cultures and CXR WNL.     #Fever  -given cultures are negative, CXR clear, RVP negative no other localizing infectious signs remaining possible source of infection is chorioamnionitis however patient without leukocytosis > 15, bandemia, purulent cervical discharge (per patient). Patient had continued to have fevers up until last night. She is reluctant to have amniocentesis   -continue with zosyn for now; this should cover past infections with GBS, bacteroides.   -continue to follow blood and urine cultures      Yamila Johnson, PGY 2  Internal Medicine  Pager [phone] | [phone]

## 2018-06-01 NOTE — DISCHARGE NOTE ANTEPARTUM - HOSPITAL COURSE
38 yo  at 17w1d a/w fevers at home. Patient had been admitted for subjective fevers previously and discharged. On the day of discharge (5/31), patient went home and had fevers at home so came back. Patient was febrile at presentation to 38.4. CXR and RVP were negative. WBC and lactate were normal. Blood cultures and urine cultures were taken, which grew _____. Patient was started on Zosyn at admission. ID was consulted.       At time of discharge, pt was ambulating, tolerating PO and afebrile. Patient to f/u with Dr. Roman as outpatient. 38 yo  at 17w1d a/w fevers at home. Patient had been admitted for subjective fevers previously and discharged. On the day of discharge (5/31), patient went home and had fevers at home so came back. Patient was febrile at presentation to 38.4. CXR and RVP were negative. WBC and lactate were normal. Blood cultures and urine cultures were taken, which grew PEPTONIPHILUS ASACCHAROLYTICUS  .Patient was started on Zosyn at admission. ID was consulted. She remained on Zosyn for 14 days and was switchd to Amoxicillin 500 BID. Sonogram revealed no fluid on 6/13.   Please call to schedule an appointment in 2 weeks with Dr. Roman. Follow up with Infectious Disease.

## 2018-06-02 DIAGNOSIS — R50.9 FEVER, UNSPECIFIED: ICD-10-CM

## 2018-06-02 DIAGNOSIS — O26.899 OTHER SPECIFIED PREGNANCY RELATED CONDITIONS, UNSPECIFIED TRIMESTER: ICD-10-CM

## 2018-06-02 LAB
ALBUMIN SERPL ELPH-MCNC: 3.1 G/DL — LOW (ref 3.3–5)
ALP SERPL-CCNC: 154 U/L — HIGH (ref 40–120)
ALT FLD-CCNC: 25 U/L — SIGNIFICANT CHANGE UP (ref 4–33)
AMYLASE P1 CFR SERPL: 27 U/L — SIGNIFICANT CHANGE UP (ref 25–125)
APTT BLD: 30.4 SEC — SIGNIFICANT CHANGE UP (ref 27.5–37.4)
AST SERPL-CCNC: 23 U/L — SIGNIFICANT CHANGE UP (ref 4–32)
BASOPHILS # BLD AUTO: 0.02 K/UL — SIGNIFICANT CHANGE UP (ref 0–0.2)
BASOPHILS NFR BLD AUTO: 0.2 % — SIGNIFICANT CHANGE UP (ref 0–2)
BILIRUB SERPL-MCNC: < 0.2 MG/DL — LOW (ref 0.2–1.2)
BUN SERPL-MCNC: 7 MG/DL — SIGNIFICANT CHANGE UP (ref 7–23)
CALCIUM SERPL-MCNC: 8.6 MG/DL — SIGNIFICANT CHANGE UP (ref 8.4–10.5)
CHLORIDE SERPL-SCNC: 97 MMOL/L — LOW (ref 98–107)
CO2 SERPL-SCNC: 23 MMOL/L — SIGNIFICANT CHANGE UP (ref 22–31)
CREAT SERPL-MCNC: 0.52 MG/DL — SIGNIFICANT CHANGE UP (ref 0.5–1.3)
EOSINOPHIL # BLD AUTO: 0.04 K/UL — SIGNIFICANT CHANGE UP (ref 0–0.5)
EOSINOPHIL NFR BLD AUTO: 0.4 % — SIGNIFICANT CHANGE UP (ref 0–6)
FIBRINOGEN PPP-MCNC: 693.3 MG/DL — HIGH (ref 310–510)
GLUCOSE SERPL-MCNC: 85 MG/DL — SIGNIFICANT CHANGE UP (ref 70–99)
HCT VFR BLD CALC: 27.6 % — LOW (ref 34.5–45)
HGB BLD-MCNC: 9.5 G/DL — LOW (ref 11.5–15.5)
IMM GRANULOCYTES # BLD AUTO: 0.06 # — SIGNIFICANT CHANGE UP
IMM GRANULOCYTES NFR BLD AUTO: 0.5 % — SIGNIFICANT CHANGE UP (ref 0–1.5)
INR BLD: 0.96 — SIGNIFICANT CHANGE UP (ref 0.88–1.17)
LACTATE SERPL-SCNC: 0.7 MMOL/L — SIGNIFICANT CHANGE UP (ref 0.5–2)
LIDOCAIN IGE QN: 23.9 U/L — SIGNIFICANT CHANGE UP (ref 7–60)
LYMPHOCYTES # BLD AUTO: 1.08 K/UL — SIGNIFICANT CHANGE UP (ref 1–3.3)
LYMPHOCYTES # BLD AUTO: 9.6 % — LOW (ref 13–44)
MCHC RBC-ENTMCNC: 30.2 PG — SIGNIFICANT CHANGE UP (ref 27–34)
MCHC RBC-ENTMCNC: 34.4 % — SIGNIFICANT CHANGE UP (ref 32–36)
MCV RBC AUTO: 87.6 FL — SIGNIFICANT CHANGE UP (ref 80–100)
MONOCYTES # BLD AUTO: 1.02 K/UL — HIGH (ref 0–0.9)
MONOCYTES NFR BLD AUTO: 9.1 % — SIGNIFICANT CHANGE UP (ref 2–14)
NEUTROPHILS # BLD AUTO: 9.01 K/UL — HIGH (ref 1.8–7.4)
NEUTROPHILS NFR BLD AUTO: 80.2 % — HIGH (ref 43–77)
NRBC # FLD: 0 — SIGNIFICANT CHANGE UP
ORGANISM # SPEC MICROSCOPIC CNT: SIGNIFICANT CHANGE UP
PLATELET # BLD AUTO: 176 K/UL — SIGNIFICANT CHANGE UP (ref 150–400)
PMV BLD: 10.2 FL — SIGNIFICANT CHANGE UP (ref 7–13)
POTASSIUM SERPL-MCNC: 3.7 MMOL/L — SIGNIFICANT CHANGE UP (ref 3.5–5.3)
POTASSIUM SERPL-SCNC: 3.7 MMOL/L — SIGNIFICANT CHANGE UP (ref 3.5–5.3)
PROT SERPL-MCNC: 6.1 G/DL — SIGNIFICANT CHANGE UP (ref 6–8.3)
PROTHROM AB SERPL-ACNC: 11 SEC — SIGNIFICANT CHANGE UP (ref 9.8–13.1)
RBC # BLD: 3.15 M/UL — LOW (ref 3.8–5.2)
RBC # FLD: 12.4 % — SIGNIFICANT CHANGE UP (ref 10.3–14.5)
SODIUM SERPL-SCNC: 132 MMOL/L — LOW (ref 135–145)
WBC # BLD: 11.23 K/UL — HIGH (ref 3.8–10.5)
WBC # FLD AUTO: 11.23 K/UL — HIGH (ref 3.8–10.5)

## 2018-06-02 PROCEDURE — 99232 SBSQ HOSP IP/OBS MODERATE 35: CPT

## 2018-06-02 RX ORDER — FLUCONAZOLE 150 MG/1
150 TABLET ORAL ONCE
Qty: 0 | Refills: 0 | Status: COMPLETED | OUTPATIENT
Start: 2018-06-02 | End: 2018-06-02

## 2018-06-02 RX ORDER — VANCOMYCIN HCL 1 G
1250 VIAL (EA) INTRAVENOUS EVERY 12 HOURS
Qty: 0 | Refills: 0 | Status: DISCONTINUED | OUTPATIENT
Start: 2018-06-02 | End: 2018-06-04

## 2018-06-02 RX ORDER — HEPARIN SODIUM 5000 [USP'U]/ML
5000 INJECTION INTRAVENOUS; SUBCUTANEOUS EVERY 12 HOURS
Qty: 0 | Refills: 0 | Status: DISCONTINUED | OUTPATIENT
Start: 2018-06-02 | End: 2018-06-14

## 2018-06-02 RX ADMIN — Medication 0.25 MILLIGRAM(S): at 21:41

## 2018-06-02 RX ADMIN — Medication 100 MILLIGRAM(S): at 05:48

## 2018-06-02 RX ADMIN — HEPARIN SODIUM 5000 UNIT(S): 5000 INJECTION INTRAVENOUS; SUBCUTANEOUS at 18:01

## 2018-06-02 RX ADMIN — PIPERACILLIN AND TAZOBACTAM 25 GRAM(S): 4; .5 INJECTION, POWDER, LYOPHILIZED, FOR SOLUTION INTRAVENOUS at 05:51

## 2018-06-02 RX ADMIN — Medication 1 TABLET(S): at 11:48

## 2018-06-02 RX ADMIN — PIPERACILLIN AND TAZOBACTAM 25 GRAM(S): 4; .5 INJECTION, POWDER, LYOPHILIZED, FOR SOLUTION INTRAVENOUS at 14:29

## 2018-06-02 RX ADMIN — FLUCONAZOLE 150 MILLIGRAM(S): 150 TABLET ORAL at 05:48

## 2018-06-02 RX ADMIN — PIPERACILLIN AND TAZOBACTAM 25 GRAM(S): 4; .5 INJECTION, POWDER, LYOPHILIZED, FOR SOLUTION INTRAVENOUS at 21:41

## 2018-06-02 RX ADMIN — Medication 166.67 MILLIGRAM(S): at 11:48

## 2018-06-02 NOTE — PROGRESS NOTE ADULT - ATTENDING COMMENTS
Mark Mukherjee  Pager: [phone]. If no response or past 5 pm call [phone].     Paged OB resident, awaiting call back. Mark Mukherjee  Pager: [phone]. If no response or past 5 pm call [phone].

## 2018-06-02 NOTE — PROGRESS NOTE ADULT - SUBJECTIVE AND OBJECTIVE BOX
37y old  Female who presents with a chief complaint of fevers in pregnancy       Interval history:  Febrile overnight, says she had a panic attack overnight, having pinkish vaginal discharge, some cough, no abdominal pain, occasional nausea. She says if they have to terminate this baby she is mentally prepared, worried about the bacteremia.       Allergies:   Kefzol (Hives; Rash; Chills)      Antimicrobials:  piperacillin/tazobactam IVPB. 3.375 Gram(s) IV Intermittent every 8 hours  vancomycin  IVPB 1250 milliGRAM(s) IV Intermittent every 12 hours      REVIEW OF SYSTEMS:  No chest pain or palpitations  No SOB  No dysuria   No rash.     Vital Signs Last 24 Hrs  T(C): 36.7 (06-02-18 @ 09:16), Max: 38.7 (06-01-18 @ 22:09)  T(F): 98 (06-02-18 @ 09:16), Max: 101.6 (06-01-18 @ 22:09)  HR: 81 (06-02-18 @ 09:16) (80 - 102)  BP: 114/66 (06-02-18 @ 09:16) (95/61 - 132/71)  RR: 18 (06-02-18 @ 09:16) (16 - 19)  SpO2: 99% (06-02-18 @ 09:16) (98% - 100%)      PHYSICAL EXAM:  Patient in no acute distress. AAOX3. Tearful.   Cardiovascular: S1S2 normal.   Lungs: Good air entry B/L lung fields.  Gastrointestinal: soft, nondistended, gravid uterus.   Extremities: no edema.  IV sites not inflamed.                           9.5    11.23 )-----------( 176      ( 02 Jun 2018 06:00 )             27.6   06-02    132<L>  |  97<L>  |  7   ----------------------------<  85  3.7   |  23  |  0.52    Ca    8.6      02 Jun 2018 06:00    TPro  6.1  /  Alb  3.1<L>  /  TBili  < 0.2<L>  /  DBili  x   /  AST  23  /  ALT  25  /  AlkPhos  154<H>  06-02      LIVER FUNCTIONS - ( 02 Jun 2018 06:00 )  Alb: 3.1 g/dL / Pro: 6.1 g/dL / ALK PHOS: 154 u/L / ALT: 25 u/L / AST: 23 u/L / GGT: x             Culture - Blood (collected 31 May 2018 20:45)  Source: BLOOD VENOUS  Preliminary Report (01 Jun 2018 20:45):    NO ORGANISMS ISOLATED    NO ORGANISMS ISOLATED AT 24 HOURS      Culture - Blood (collected 31 May 2018 20:45)  Source: BLOOD PERIPHERAL  Preliminary Report (01 Jun 2018 20:45):    NO ORGANISMS ISOLATED    NO ORGANISMS ISOLATED AT 24 HOURS      Culture - Blood (collected 31 May 2018 04:14)  Source: BLOOD PERIPHERAL  Preliminary Report (01 Jun 2018 04:14):    NO ORGANISMS ISOLATED    NO ORGANISMS ISOLATED AT 24 HOURS      Culture - Blood (collected 31 May 2018 04:14)  Source: BLOOD PERIPHERAL  Preliminary Report (02 Jun 2018 07:24):    CULTURE IN PROGRESS, FURTHER REPORT TO FOLLOW.  GPC in clusters.       Culture - Urine (collected 31 May 2018 02:09)  Source: URINE MIDSTREAM  Final Report (01 Jun 2018 08:26):    NO GROWTH AT 24 HOURS

## 2018-06-02 NOTE — PROGRESS NOTE ADULT - ASSESSMENT
37/F  currently 17 weeks pregnant comes to [facility] with fevers.  Sepsis (fever, tachycardia, bandemia) secondary to GPC in clusters bacteremia likely due to chorioamnionitis.   Afebrile today.       Plan:   Continue with Zosyn at current dose.  Added Vancomycin 1250 mg iv q12h, slow infusion.  Check trough prior to 4th dose.  Follow up final identification of GPC   Follow up prelim blood cx, NTD.   Urine cx negative. 37/F  currently 17 weeks pregnant comes to [facility] with fevers.  Sepsis (fever, tachycardia, bandemia) secondary to GPC in clusters bacteremia likely due to chorioamnionitis.   Afebrile today.       Plan:   Continue with Zosyn at current dose.  Added Vancomycin 1250 mg iv q12h, slow infusion.  Check trough prior to 4th dose.  Follow up final identification of GPC   Follow up prelim blood cx, NTD.   Urine cx negative.   Consider abdominal sono if feasible.

## 2018-06-03 LAB
BASOPHILS # BLD AUTO: 0.02 K/UL — SIGNIFICANT CHANGE UP (ref 0–0.2)
BASOPHILS NFR BLD AUTO: 0.2 % — SIGNIFICANT CHANGE UP (ref 0–2)
EOSINOPHIL # BLD AUTO: 0.07 K/UL — SIGNIFICANT CHANGE UP (ref 0–0.5)
EOSINOPHIL NFR BLD AUTO: 0.7 % — SIGNIFICANT CHANGE UP (ref 0–6)
HCT VFR BLD CALC: 26.5 % — LOW (ref 34.5–45)
HGB BLD-MCNC: 9.3 G/DL — LOW (ref 11.5–15.5)
IMM GRANULOCYTES # BLD AUTO: 0.04 # — SIGNIFICANT CHANGE UP
IMM GRANULOCYTES NFR BLD AUTO: 0.4 % — SIGNIFICANT CHANGE UP (ref 0–1.5)
LYMPHOCYTES # BLD AUTO: 1.36 K/UL — SIGNIFICANT CHANGE UP (ref 1–3.3)
LYMPHOCYTES # BLD AUTO: 13.4 % — SIGNIFICANT CHANGE UP (ref 13–44)
MCHC RBC-ENTMCNC: 30.7 PG — SIGNIFICANT CHANGE UP (ref 27–34)
MCHC RBC-ENTMCNC: 35.1 % — SIGNIFICANT CHANGE UP (ref 32–36)
MCV RBC AUTO: 87.5 FL — SIGNIFICANT CHANGE UP (ref 80–100)
METHOD TYPE: SIGNIFICANT CHANGE UP
MONOCYTES # BLD AUTO: 1.28 K/UL — HIGH (ref 0–0.9)
MONOCYTES NFR BLD AUTO: 12.6 % — SIGNIFICANT CHANGE UP (ref 2–14)
NEUTROPHILS # BLD AUTO: 7.38 K/UL — SIGNIFICANT CHANGE UP (ref 1.8–7.4)
NEUTROPHILS NFR BLD AUTO: 72.7 % — SIGNIFICANT CHANGE UP (ref 43–77)
NRBC # FLD: 0 — SIGNIFICANT CHANGE UP
ORGANISM # SPEC MICROSCOPIC CNT: SIGNIFICANT CHANGE UP
ORGANISM # SPEC MICROSCOPIC CNT: SIGNIFICANT CHANGE UP
PLATELET # BLD AUTO: 196 K/UL — SIGNIFICANT CHANGE UP (ref 150–400)
PMV BLD: 10.3 FL — SIGNIFICANT CHANGE UP (ref 7–13)
RBC # BLD: 3.03 M/UL — LOW (ref 3.8–5.2)
RBC # FLD: 12.5 % — SIGNIFICANT CHANGE UP (ref 10.3–14.5)
WBC # BLD: 10.15 K/UL — SIGNIFICANT CHANGE UP (ref 3.8–10.5)
WBC # FLD AUTO: 10.15 K/UL — SIGNIFICANT CHANGE UP (ref 3.8–10.5)

## 2018-06-03 RX ORDER — ACETAMINOPHEN 500 MG
650 TABLET ORAL ONCE
Qty: 0 | Refills: 0 | Status: COMPLETED | OUTPATIENT
Start: 2018-06-03 | End: 2018-06-03

## 2018-06-03 RX ADMIN — PIPERACILLIN AND TAZOBACTAM 25 GRAM(S): 4; .5 INJECTION, POWDER, LYOPHILIZED, FOR SOLUTION INTRAVENOUS at 14:21

## 2018-06-03 RX ADMIN — Medication 1 TABLET(S): at 12:18

## 2018-06-03 RX ADMIN — Medication 650 MILLIGRAM(S): at 02:00

## 2018-06-03 RX ADMIN — Medication 166.67 MILLIGRAM(S): at 12:16

## 2018-06-03 RX ADMIN — Medication 650 MILLIGRAM(S): at 01:34

## 2018-06-03 RX ADMIN — PIPERACILLIN AND TAZOBACTAM 25 GRAM(S): 4; .5 INJECTION, POWDER, LYOPHILIZED, FOR SOLUTION INTRAVENOUS at 22:17

## 2018-06-03 RX ADMIN — Medication 100 MILLIGRAM(S): at 14:20

## 2018-06-03 RX ADMIN — Medication 166.67 MILLIGRAM(S): at 01:48

## 2018-06-03 RX ADMIN — PIPERACILLIN AND TAZOBACTAM 25 GRAM(S): 4; .5 INJECTION, POWDER, LYOPHILIZED, FOR SOLUTION INTRAVENOUS at 05:27

## 2018-06-04 ENCOUNTER — APPOINTMENT (OUTPATIENT)
Dept: ANTEPARTUM | Facility: HOSPITAL | Age: 38
End: 2018-06-04
Payer: COMMERCIAL

## 2018-06-04 ENCOUNTER — ASOB RESULT (OUTPATIENT)
Age: 38
End: 2018-06-04

## 2018-06-04 LAB
BASOPHILS # BLD AUTO: 0.04 K/UL — SIGNIFICANT CHANGE UP (ref 0–0.2)
BASOPHILS NFR BLD AUTO: 0.3 % — SIGNIFICANT CHANGE UP (ref 0–2)
EOSINOPHIL # BLD AUTO: 0.07 K/UL — SIGNIFICANT CHANGE UP (ref 0–0.5)
EOSINOPHIL NFR BLD AUTO: 0.5 % — SIGNIFICANT CHANGE UP (ref 0–6)
HCT VFR BLD CALC: 28.7 % — LOW (ref 34.5–45)
HGB BLD-MCNC: 9.9 G/DL — LOW (ref 11.5–15.5)
IMM GRANULOCYTES # BLD AUTO: 0.08 # — SIGNIFICANT CHANGE UP
IMM GRANULOCYTES NFR BLD AUTO: 0.6 % — SIGNIFICANT CHANGE UP (ref 0–1.5)
LYMPHOCYTES # BLD AUTO: 1.2 K/UL — SIGNIFICANT CHANGE UP (ref 1–3.3)
LYMPHOCYTES # BLD AUTO: 8.8 % — LOW (ref 13–44)
MCHC RBC-ENTMCNC: 30.2 PG — SIGNIFICANT CHANGE UP (ref 27–34)
MCHC RBC-ENTMCNC: 34.5 % — SIGNIFICANT CHANGE UP (ref 32–36)
MCV RBC AUTO: 87.5 FL — SIGNIFICANT CHANGE UP (ref 80–100)
MONOCYTES # BLD AUTO: 0.96 K/UL — HIGH (ref 0–0.9)
MONOCYTES NFR BLD AUTO: 7 % — SIGNIFICANT CHANGE UP (ref 2–14)
NEUTROPHILS # BLD AUTO: 11.32 K/UL — HIGH (ref 1.8–7.4)
NEUTROPHILS NFR BLD AUTO: 82.8 % — HIGH (ref 43–77)
NRBC # FLD: 0 — SIGNIFICANT CHANGE UP
ORGANISM # SPEC MICROSCOPIC CNT: SIGNIFICANT CHANGE UP
PLATELET # BLD AUTO: 201 K/UL — SIGNIFICANT CHANGE UP (ref 150–400)
PMV BLD: 10.4 FL — SIGNIFICANT CHANGE UP (ref 7–13)
RBC # BLD: 3.28 M/UL — LOW (ref 3.8–5.2)
RBC # FLD: 12.3 % — SIGNIFICANT CHANGE UP (ref 10.3–14.5)
WBC # BLD: 13.67 K/UL — HIGH (ref 3.8–10.5)
WBC # FLD AUTO: 13.67 K/UL — HIGH (ref 3.8–10.5)

## 2018-06-04 PROCEDURE — 99232 SBSQ HOSP IP/OBS MODERATE 35: CPT

## 2018-06-04 PROCEDURE — 76816 OB US FOLLOW-UP PER FETUS: CPT | Mod: 26

## 2018-06-04 RX ORDER — SIMETHICONE 80 MG/1
80 TABLET, CHEWABLE ORAL EVERY 8 HOURS
Qty: 0 | Refills: 0 | Status: DISCONTINUED | OUTPATIENT
Start: 2018-06-04 | End: 2018-06-14

## 2018-06-04 RX ORDER — ACETAMINOPHEN 500 MG
975 TABLET ORAL EVERY 6 HOURS
Qty: 0 | Refills: 0 | Status: DISCONTINUED | OUTPATIENT
Start: 2018-06-04 | End: 2018-06-14

## 2018-06-04 RX ORDER — DIAZEPAM 5 MG
2 TABLET ORAL ONCE
Qty: 0 | Refills: 0 | Status: DISCONTINUED | OUTPATIENT
Start: 2018-06-04 | End: 2018-06-04

## 2018-06-04 RX ADMIN — Medication 1 TABLET(S): at 14:49

## 2018-06-04 RX ADMIN — Medication 166.67 MILLIGRAM(S): at 00:56

## 2018-06-04 RX ADMIN — Medication 975 MILLIGRAM(S): at 03:23

## 2018-06-04 RX ADMIN — Medication 100 MILLIGRAM(S): at 18:42

## 2018-06-04 RX ADMIN — PIPERACILLIN AND TAZOBACTAM 25 GRAM(S): 4; .5 INJECTION, POWDER, LYOPHILIZED, FOR SOLUTION INTRAVENOUS at 15:17

## 2018-06-04 RX ADMIN — PIPERACILLIN AND TAZOBACTAM 25 GRAM(S): 4; .5 INJECTION, POWDER, LYOPHILIZED, FOR SOLUTION INTRAVENOUS at 22:20

## 2018-06-04 RX ADMIN — PIPERACILLIN AND TAZOBACTAM 25 GRAM(S): 4; .5 INJECTION, POWDER, LYOPHILIZED, FOR SOLUTION INTRAVENOUS at 06:27

## 2018-06-04 RX ADMIN — Medication 2 MILLIGRAM(S): at 18:42

## 2018-06-04 RX ADMIN — Medication 975 MILLIGRAM(S): at 04:23

## 2018-06-04 RX ADMIN — Medication 100 MILLIGRAM(S): at 06:27

## 2018-06-04 NOTE — PROGRESS NOTE ADULT - SUBJECTIVE AND OBJECTIVE BOX
37y old  Female who presents with a chief complaint of fevers in pregnancy       Interval history: crampy abdominal pain. vaginal clot this AM. feels better now.  had BM. usually constipated.  no fever, chills.      Allergies:   Kefzol (Hives; Rash; Chills)      Antimicrobials:  piperacillin/tazobactam IVPB. 3.375 Gram(s) IV Intermittent every 8 hours  vancomycin  IVPB 1250 milliGRAM(s) IV Intermittent every 12 hours      REVIEW OF SYSTEMS:  No chest pain or palpitations  No SOB  No dysuria   No rash.     Vital Signs Last 24 Hrs  T(F): 97.9 (06-04-18 @ 09:32), Max: 99.4 (06-03-18 @ 17:41)  HR: 84 (06-04-18 @ 09:32)  BP: 108/59 (06-04-18 @ 09:32)  RR: 18 (06-04-18 @ 09:32)  SpO2: 99% (06-04-18 @ 09:32) (98% - 100%)      PHYSICAL EXAM:  Patient in no acute distress. AAOX3.    Cardiovascular: S1S2 normal.   Lungs: Good air entry B/L lung fields.  Gastrointestinal: soft, nondistended, gravid uterus. BS+  Extremities: no edema, rash                            9.9    13.67 )-----------( 201      ( 04 Jun 2018 06:46 )             28.7           Culture - Blood (05.31.18 @ 20:45)    Culture - Blood:   NO ORGANISMS ISOLATED  NO ORGANISMS ISOLATED AT 72 HRS.    Specimen Source: BLOOD VENOUS    Culture - Blood (05.31.18 @ 20:45)    Culture - Blood:   NO ORGANISMS ISOLATED  NO ORGANISMS ISOLATED AT 72 HRS.    Specimen Source: BLOOD PERIPHERAL        Culture - Blood (05.31.18 @ 04:14)    Culture - Blood:   CULTURE IN PROGRESS, FURTHER REPORT TO FOLLOW.    Culture - Blood:   ***Blood Panel PCR results on this specimen are available  approximately 3 hours after the Gram stain result***  Gram stain, PCR, and/or culture results may not always  correspond due to difference in methodologies  ------------------------------------------------------------  This PCR assay was performed using [employer].  The  following targets are tested for:  Enterococcus, vancomycin  resistant enterococci, Listeria monocytogenes,  coagulase  negative staphylococci, S. aureus, methicillin resistant S.  aureus, Streptococcus agalactiae (Group B), S. pneumoniae,  S. pyogenes (Group A), Acinetobacter baumannii, Enterobacter  cloacae, E. coli, Klebsiella oxytoca, K. pneumoniae, Proteus  sp., Serratia marcescens, Haemophilus influenzae, Neisseria  meningitidis, Pseudomonas aeruginosa, Candida albicans, C.  glabrata, C. krusei, C. parapsilosis, C. tropicalis and the  KPC resistance gene.  **NOTE: Due to technical problems, Proteus sp. will NOT be  reported as part of the BCID paneluntil further notice.    -  Multidrug (KPC pos) resistant organism: - NOTDETECT RANDALL    -  Staphylococcus aureus: - NOTDETECT RANDALL    -  Methicillin resistant Staphylococcus aureus (MRSA): - NOTDETECT RANDALL    -  Coagulase negative Staphylococcus: - NOTDETECT RANDALL    -  Enterococcus species: - NOTDETECT RANDALL    -  Vancomycin resistant Enterococcus sp.: - NOTDETECT RNADALL    -  Escherichia coli: - NOTDETECT RANDALL    -  Klebsiella oxytoca: - NOTDETECT RANDALL    -  Klebsiella pneumoniae: - NOTDETECT RANDALL    -  Serratia marcescens: - NOTDETECT RANDALL    -  Haemophilus influenzae: - NOTDETECT RANDALL    -  Listeria monocytogenes: - NOTDETECT RANDALL    -  Neisseria meningitidis: - NOTDETECT RANDALL    -  Pseudomonas aeruginosa: - NOTDETECT RANDALL    -  Acinetobacter baumanii: - NOTDETECT RANDALL    -  Enterobacter cloacae complex: - NOTDETECT RANDALL    -  Streptococcus sp. (Not Grp A, B or S pneumoniae): - NOTDETECT RANDALL    -  Streptococcus agalactiae (Group B): - NOTDETECT RANDALL    -  Streptococcus pyogenes (Group A): - NOTDETECT RANDALL    -  Streptococcus pneumoniae: - NOTDETECT RANDALL    -  Candida albicans: - NOTDETECT RANDALL    -  Candida glabrata: - NOTDETECT RANDALL    -  Cristina krusei: - NOTDETECT RANDALL    -  Candida parapsilosis: - NOTDETECT RANDALL    -  Candida tropicalis: - NOTDETECT RANDALL    Specimen Source: BLOOD PERIPHERAL    Organism: BLOOD CULTURE PCR  ***Blood Panel PCR results on this specimen are available  approximately 3 hours after the Gram stain result***  Gram stain, PCR, and/or culture results may not always  correspond due to difference in methodologies  ------------------------------------------------------------  This PCR assay was performed using [employer].  The  following targets are tested for:  Enterococcus, vancomycin  resistant enterococci, Listeria monocytogenes,  coagulase  negative staphylococci, S. aureus, methicillin resistant S.  aureus, Streptococcus agalactiae (Group B), S. pneumoniae,  S. pyogenes (Group A), Acinetobacter baumannii, Enterobacter  cloacae, E. coli, Klebsiella oxytoca, K. pneumoniae, Proteus  sp., Serratia marcescens, Haemophilus influenzae, Neisseria  meningitidis, Pseudomonas aeruginosa, Candida albicans, C.  glabrata, C. krusei, C. parapsilosis, C. tropicalis and the  KPC resistance gene.  **NOTE: Due to technical problems, Proteus sp. will NOT be  reported as part of the BCID panel until further notice.    Organism: Clostridium difficile    Gram Stain Blood:   ***** CRITICAL RESULT *****  PERSON CALLED / READ-BACK: ANNE-MARIE LESTER RN/Y  DATE / TIME CALLED: 06/01/18 2322  CALLED BY: LUCIA BRIONES  GPCCL^Gram Pos Cocci In Clusters  AFTER: 42 HOURS INCUBATION  BOTTLE: ANAEROBIC BOTTLE    Organism Identification: BLOOD CULTURE PCR  Clostridium difficile    Method Type: PCR      Culture - Blood (05.31.18 @ 04:14)    -  Streptococcus sp. (Not Grp A, B or S pneumoniae): - NOTDETECT RANDALL    -  Enterobacter cloacae complex: - NOTDETECT RANDALL    -  Acinetobacter baumanii: - NOTDETECT RANDALL    -  Pseudomonas aeruginosa: - NOTDETECT RANDALL    -  Neisseria meningitidis: - NOTDETECT RANDALL    -  Listeria monocytogenes: - NOTDETECT RANDALL    -  Haemophilus influenzae: - NOTDETECT RANDALL    -  Serratia marcescens: - NOTDETECT RANDALL    -  Klebsiella pneumoniae: - NOTDETECT RANDALL    -  Klebsiella oxytoca: - NOTDETECT RANDALL    -  Escherichia coli: - NOTDETECT RANDALL    -  Vancomycin resistant Enterococcus sp.: - NOTDETECT RANDALL    -  Enterococcus species: - NOTDETECT RANDALL    -  Coagulase negative Staphylococcus: - NOTDETECT RANDALL    -  Methicillin resistant Staphylococcus aureus (MRSA): - NOTDETECT RANDALL    -  Staphylococcus aureus: - NOTDETECT RANDALL    Culture - Blood:   ***Blood Panel PCR results on this specimen are available  approximately 3 hours after the Gram stain result***  Gram stain, PCR, and/or culture results may not always  correspond due to difference in methodologies  ------------------------------------------------------------  This PCR assay was performed using [employer].  The  following targets are tested for:  Enterococcus, vancomycin  resistant enterococci, Listeria monocytogenes,  coagulase  negative staphylococci, S. aureus, methicillin resistant S.  aureus, Streptococcus agalactiae (Group B), S. pneumoniae,  S. pyogenes (Group A), Acinetobacter baumannii, Enterobacter  cloacae, E. coli, Klebsiella oxytoca, K. pneumoniae, Proteus  sp., Serratia marcescens, Haemophilus influenzae, Neisseria  meningitidis, Pseudomonas aeruginosa, Candida albicans, C.  glabrata, C. krusei, C. parapsilosis, C. tropicalis and the  KPC resistance gene.  **NOTE: Due to technical problems, Proteus sp. will NOT be  reported as part of the BCID paneluntil further notice.    Culture - Blood:   **PEPTONIPHILUS ASACCHAROLYTICUS**    -  Multidrug (KPC pos) resistant organism: - NOTDETECT RANDALL    -  Candida tropicalis: - NOTDETECT RANDALL    -  Candida parapsilosis: - NOTDETECT RANDALL    -  Cristina krusei: - NOTDETECT RANDALL    -  Candida glabrata: - NOTDETECT RANDALL    -  Candida albicans: - NOTDETECT RANDALL    -  Streptococcus pneumoniae: - NOTDETECT RANDALL    -  Streptococcus pyogenes (Group A): - NOTDETECT RANDALL    -  Streptococcus agalactiae (Group B): - NOTDETECT RANDALL    Specimen Source: BLOOD PERIPHERAL    Organism: BLOOD CULTURE PCR  ***Blood Panel PCR results on this specimen are available  approximately 3 hours after the Gram stain result***  Gram stain, PCR, and/or culture results may not always  correspond due to difference in methodologies  ------------------------------------------------------------  This PCR assay was performed using [employer].  The  following targets are tested for:  Enterococcus, vancomycin  resistant enterococci, Listeria monocytogenes,  coagulase  negative staphylococci, S. aureus, methicillin resistant S.  aureus, Streptococcus agalactiae (Group B), S. pneumoniae,  S. pyogenes (Group A), Acinetobacter baumannii, Enterobacter  cloacae, E. coli, Klebsiella oxytoca, K. pneumoniae, Proteus  sp., Serratia marcescens, Haemophilus influenzae, Neisseria  meningitidis, Pseudomonas aeruginosa, Candida albicans, C.  glabrata, C. krusei, C. parapsilosis, C. tropicalis and the  KPC resistance gene.  **NOTE: Due to technical problems, Proteus sp. will NOT be  reported as part of the BCID panel until further notice.    Gram Stain Blood:   ***** CRITICAL RESULT *****  PERSON CALLED / READ-BACK: MCKAYLA DEL ROSARIO RN / Y  DATE / TIME CALLED: 06/01/18 1825  CALLED BY: SHAMIKA LEONARDO  GPCCL^Gram Pos Cocci In Clusters  AFTER: 35 HOURS INCUBATION  BOTTLE: ANAEROBIC BOTTLE    Organism Identification: BLOOD CULTURE PCR    Method Type: PCR

## 2018-06-04 NOTE — PROGRESS NOTE ADULT - ASSESSMENT
37/F  currently 17 weeks pregnant with sepsis thought due to chorioamnionitis. Blood culture now identified peptoniphilus and ?clostridium difficile. C dif in blood would be unusual-  asked micro to verify.   Afebrile now but having vaginal clots.    WBC trending up.      Plan:   Continue with Zosyn at current dose.  d/c Vancomycin  Follow up final identification of blood cultures.  Abdominal sono  consider GI eval

## 2018-06-04 NOTE — CHART NOTE - NSCHARTNOTEFT_GEN_A_CORE
discussed with microbiology supervisor-  organism in + blood culture not clostridium difficile.  Further ID to follow.  would continue zosyn.

## 2018-06-05 ENCOUNTER — APPOINTMENT (OUTPATIENT)
Dept: ANTEPARTUM | Facility: HOSPITAL | Age: 38
End: 2018-06-05

## 2018-06-05 DIAGNOSIS — R50.9 FEVER, UNSPECIFIED: ICD-10-CM

## 2018-06-05 LAB
BACTERIA BLD CULT: SIGNIFICANT CHANGE UP
BACTERIA BLD CULT: SIGNIFICANT CHANGE UP
BASOPHILS # BLD AUTO: 0.04 K/UL — SIGNIFICANT CHANGE UP (ref 0–0.2)
BASOPHILS NFR BLD AUTO: 0.3 % — SIGNIFICANT CHANGE UP (ref 0–2)
EOSINOPHIL # BLD AUTO: 0.06 K/UL — SIGNIFICANT CHANGE UP (ref 0–0.5)
EOSINOPHIL NFR BLD AUTO: 0.4 % — SIGNIFICANT CHANGE UP (ref 0–6)
HCT VFR BLD CALC: 28.3 % — LOW (ref 34.5–45)
HCT VFR BLD CALC: 31.4 % — LOW (ref 34.5–45)
HGB BLD-MCNC: 10.3 G/DL — LOW (ref 11.5–15.5)
HGB BLD-MCNC: 9.8 G/DL — LOW (ref 11.5–15.5)
IMM GRANULOCYTES # BLD AUTO: 0.07 # — SIGNIFICANT CHANGE UP
IMM GRANULOCYTES NFR BLD AUTO: 0.5 % — SIGNIFICANT CHANGE UP (ref 0–1.5)
LYMPHOCYTES # BLD AUTO: 1.43 K/UL — SIGNIFICANT CHANGE UP (ref 1–3.3)
LYMPHOCYTES # BLD AUTO: 10.1 % — LOW (ref 13–44)
MCHC RBC-ENTMCNC: 30 PG — SIGNIFICANT CHANGE UP (ref 27–34)
MCHC RBC-ENTMCNC: 30.4 PG — SIGNIFICANT CHANGE UP (ref 27–34)
MCHC RBC-ENTMCNC: 32.8 % — SIGNIFICANT CHANGE UP (ref 32–36)
MCHC RBC-ENTMCNC: 34.6 % — SIGNIFICANT CHANGE UP (ref 32–36)
MCV RBC AUTO: 87.9 FL — SIGNIFICANT CHANGE UP (ref 80–100)
MCV RBC AUTO: 91.5 FL — SIGNIFICANT CHANGE UP (ref 80–100)
MONOCYTES # BLD AUTO: 0.89 K/UL — SIGNIFICANT CHANGE UP (ref 0–0.9)
MONOCYTES NFR BLD AUTO: 6.3 % — SIGNIFICANT CHANGE UP (ref 2–14)
NEUTROPHILS # BLD AUTO: 11.68 K/UL — HIGH (ref 1.8–7.4)
NEUTROPHILS NFR BLD AUTO: 82.4 % — HIGH (ref 43–77)
NRBC # FLD: 0 — SIGNIFICANT CHANGE UP
NRBC # FLD: 0 — SIGNIFICANT CHANGE UP
PLATELET # BLD AUTO: 208 K/UL — SIGNIFICANT CHANGE UP (ref 150–400)
PLATELET # BLD AUTO: 235 K/UL — SIGNIFICANT CHANGE UP (ref 150–400)
PMV BLD: 10 FL — SIGNIFICANT CHANGE UP (ref 7–13)
PMV BLD: 9.8 FL — SIGNIFICANT CHANGE UP (ref 7–13)
RBC # BLD: 3.22 M/UL — LOW (ref 3.8–5.2)
RBC # BLD: 3.43 M/UL — LOW (ref 3.8–5.2)
RBC # FLD: 12.1 % — SIGNIFICANT CHANGE UP (ref 10.3–14.5)
RBC # FLD: 12.4 % — SIGNIFICANT CHANGE UP (ref 10.3–14.5)
WBC # BLD: 14.17 K/UL — HIGH (ref 3.8–10.5)
WBC # BLD: 14.46 K/UL — HIGH (ref 3.8–10.5)
WBC # FLD AUTO: 14.17 K/UL — HIGH (ref 3.8–10.5)
WBC # FLD AUTO: 14.46 K/UL — HIGH (ref 3.8–10.5)

## 2018-06-05 RX ADMIN — PIPERACILLIN AND TAZOBACTAM 25 GRAM(S): 4; .5 INJECTION, POWDER, LYOPHILIZED, FOR SOLUTION INTRAVENOUS at 08:50

## 2018-06-05 RX ADMIN — PIPERACILLIN AND TAZOBACTAM 25 GRAM(S): 4; .5 INJECTION, POWDER, LYOPHILIZED, FOR SOLUTION INTRAVENOUS at 15:25

## 2018-06-05 RX ADMIN — PIPERACILLIN AND TAZOBACTAM 25 GRAM(S): 4; .5 INJECTION, POWDER, LYOPHILIZED, FOR SOLUTION INTRAVENOUS at 23:23

## 2018-06-05 RX ADMIN — Medication 100 MILLIGRAM(S): at 06:30

## 2018-06-06 LAB
BASOPHILS # BLD AUTO: 0.04 K/UL — SIGNIFICANT CHANGE UP (ref 0–0.2)
BASOPHILS NFR BLD AUTO: 0.3 % — SIGNIFICANT CHANGE UP (ref 0–2)
BLD GP AB SCN SERPL QL: NEGATIVE — SIGNIFICANT CHANGE UP
EOSINOPHIL # BLD AUTO: 0.04 K/UL — SIGNIFICANT CHANGE UP (ref 0–0.5)
EOSINOPHIL NFR BLD AUTO: 0.3 % — SIGNIFICANT CHANGE UP (ref 0–6)
HCT VFR BLD CALC: 29.5 % — LOW (ref 34.5–45)
HGB BLD-MCNC: 9.8 G/DL — LOW (ref 11.5–15.5)
IMM GRANULOCYTES # BLD AUTO: 0.07 # — SIGNIFICANT CHANGE UP
IMM GRANULOCYTES NFR BLD AUTO: 0.5 % — SIGNIFICANT CHANGE UP (ref 0–1.5)
LYMPHOCYTES # BLD AUTO: 1.17 K/UL — SIGNIFICANT CHANGE UP (ref 1–3.3)
LYMPHOCYTES # BLD AUTO: 8.1 % — LOW (ref 13–44)
MCHC RBC-ENTMCNC: 30.4 PG — SIGNIFICANT CHANGE UP (ref 27–34)
MCHC RBC-ENTMCNC: 33.2 % — SIGNIFICANT CHANGE UP (ref 32–36)
MCV RBC AUTO: 91.6 FL — SIGNIFICANT CHANGE UP (ref 80–100)
MONOCYTES # BLD AUTO: 0.77 K/UL — SIGNIFICANT CHANGE UP (ref 0–0.9)
MONOCYTES NFR BLD AUTO: 5.3 % — SIGNIFICANT CHANGE UP (ref 2–14)
NEUTROPHILS # BLD AUTO: 12.34 K/UL — HIGH (ref 1.8–7.4)
NEUTROPHILS NFR BLD AUTO: 85.5 % — HIGH (ref 43–77)
NRBC # FLD: 0 — SIGNIFICANT CHANGE UP
PLATELET # BLD AUTO: 209 K/UL — SIGNIFICANT CHANGE UP (ref 150–400)
PMV BLD: 9.9 FL — SIGNIFICANT CHANGE UP (ref 7–13)
RBC # BLD: 3.22 M/UL — LOW (ref 3.8–5.2)
RBC # FLD: 12.2 % — SIGNIFICANT CHANGE UP (ref 10.3–14.5)
RH IG SCN BLD-IMP: POSITIVE — SIGNIFICANT CHANGE UP
WBC # BLD: 14.43 K/UL — HIGH (ref 3.8–10.5)
WBC # FLD AUTO: 14.43 K/UL — HIGH (ref 3.8–10.5)

## 2018-06-06 PROCEDURE — 99232 SBSQ HOSP IP/OBS MODERATE 35: CPT

## 2018-06-06 RX ADMIN — Medication 1 TABLET(S): at 11:37

## 2018-06-06 RX ADMIN — PIPERACILLIN AND TAZOBACTAM 25 GRAM(S): 4; .5 INJECTION, POWDER, LYOPHILIZED, FOR SOLUTION INTRAVENOUS at 06:56

## 2018-06-06 RX ADMIN — PIPERACILLIN AND TAZOBACTAM 25 GRAM(S): 4; .5 INJECTION, POWDER, LYOPHILIZED, FOR SOLUTION INTRAVENOUS at 15:37

## 2018-06-06 RX ADMIN — Medication 100 MILLIGRAM(S): at 06:56

## 2018-06-06 RX ADMIN — Medication 100 MILLIGRAM(S): at 22:19

## 2018-06-06 RX ADMIN — PIPERACILLIN AND TAZOBACTAM 25 GRAM(S): 4; .5 INJECTION, POWDER, LYOPHILIZED, FOR SOLUTION INTRAVENOUS at 22:38

## 2018-06-06 NOTE — PROGRESS NOTE ADULT - SUBJECTIVE AND OBJECTIVE BOX
37y old  Female who presents with a chief complaint of fevers in pregnancy       Interval history: mild upper abd cramps. light pink vaginal discharge.  overall feels better;  having BM daily. usually constipated.  no fever, chills.   at bedside.      Allergies:   Kefzol (Hives; Rash; Chills)      piperacillin/tazobactam IVPB. 3.375 every 8 hours        REVIEW OF SYSTEMS:  No chest pain or palpitations  No SOB  No dysuria   No rash.     Vital Signs Last 24 Hrs  Vital Signs Last 24 Hrs  T(F): 98.7 (06-06-18 @ 10:10), Max: 99.1 (06-05-18 @ 18:53)  HR: 86 (06-06-18 @ 10:10)  BP: 110/64 (06-06-18 @ 10:10)  Culture - Blood (05.31.18 @ 20:45)    Culture - Blood:   NO ORGANISMS ISOLATED    Specimen Source: BLOOD VENOUS    RR: 18 (06-06-18 @ 10:10)  SpO2: 98% (06-06-18 @ 10:10) (98% - 100%)    PHYSICAL EXAM:  Patient in no acute distress. AAOX3.    Cardiovascular: S1S2 normal.   Lungs: Good air entry B/L lung fields.  Gastrointestinal: soft, nondistended, gravid uterus. BS+  Extremities: no edema, rash                                     9.8    14.43 )-----------( 209      ( 06 Jun 2018 06:05 )             29.5             Culture - Blood (05.31.18 @ 20:45)    Culture - Blood:   NO ORGANISMS ISOLATED    Specimen Source: BLOOD VENOUS                Culture - Blood (05.31.18 @ 20:45)    Culture - Blood:   NO ORGANISMS ISOLATED    Specimen Source: BLOOD PERIPHERAL        Culture - Blood (05.31.18 @ 04:14)    Culture - Blood:   ************************************************  PLEASE TAKE NOTE:  CORRECTED REPORT -  AN ORGANISM IN THIS CULTURE WAS ORIGINALLY IDENTIFIED AS:        CLOSTRIDIUM DIFFICILE  THE IDENTIFICATION HAS BEEN CHANGED TO:          PEPTONIPHILUS ASACCHAROLYTICUS  CONTACT MICRO LAB IF FURTHER INFORMATION IS DESIRED  ************************************************  RESULT CALLED TO / READ BACK:   /Y  DATE / TIME CALLED: 06/04/18 9723  CALLED BY: BILL REYES  Culture - Blood (05.31.18 @ 04:14)        Culture - Blood:   **PEPTONIPHILUS ASACCHAROLYTICUS**

## 2018-06-07 ENCOUNTER — APPOINTMENT (OUTPATIENT)
Dept: ANTEPARTUM | Facility: HOSPITAL | Age: 38
End: 2018-06-07
Payer: COMMERCIAL

## 2018-06-07 ENCOUNTER — ASOB RESULT (OUTPATIENT)
Age: 38
End: 2018-06-07

## 2018-06-07 LAB
BASOPHILS # BLD AUTO: 0.03 K/UL — SIGNIFICANT CHANGE UP (ref 0–0.2)
BASOPHILS NFR BLD AUTO: 0.2 % — SIGNIFICANT CHANGE UP (ref 0–2)
EOSINOPHIL # BLD AUTO: 0.07 K/UL — SIGNIFICANT CHANGE UP (ref 0–0.5)
EOSINOPHIL NFR BLD AUTO: 0.5 % — SIGNIFICANT CHANGE UP (ref 0–6)
HCT VFR BLD CALC: 29.9 % — LOW (ref 34.5–45)
HGB BLD-MCNC: 10.1 G/DL — LOW (ref 11.5–15.5)
IMM GRANULOCYTES # BLD AUTO: 0.09 # — SIGNIFICANT CHANGE UP
IMM GRANULOCYTES NFR BLD AUTO: 0.6 % — SIGNIFICANT CHANGE UP (ref 0–1.5)
LYMPHOCYTES # BLD AUTO: 1.14 K/UL — SIGNIFICANT CHANGE UP (ref 1–3.3)
LYMPHOCYTES # BLD AUTO: 7.9 % — LOW (ref 13–44)
MCHC RBC-ENTMCNC: 30.7 PG — SIGNIFICANT CHANGE UP (ref 27–34)
MCHC RBC-ENTMCNC: 33.8 % — SIGNIFICANT CHANGE UP (ref 32–36)
MCV RBC AUTO: 90.9 FL — SIGNIFICANT CHANGE UP (ref 80–100)
MONOCYTES # BLD AUTO: 0.69 K/UL — SIGNIFICANT CHANGE UP (ref 0–0.9)
MONOCYTES NFR BLD AUTO: 4.8 % — SIGNIFICANT CHANGE UP (ref 2–14)
NEUTROPHILS # BLD AUTO: 12.34 K/UL — HIGH (ref 1.8–7.4)
NEUTROPHILS NFR BLD AUTO: 86 % — HIGH (ref 43–77)
NRBC # FLD: 0 — SIGNIFICANT CHANGE UP
PLATELET # BLD AUTO: 213 K/UL — SIGNIFICANT CHANGE UP (ref 150–400)
PMV BLD: 9.9 FL — SIGNIFICANT CHANGE UP (ref 7–13)
RBC # BLD: 3.29 M/UL — LOW (ref 3.8–5.2)
RBC # FLD: 12.1 % — SIGNIFICANT CHANGE UP (ref 10.3–14.5)
WBC # BLD: 14.36 K/UL — HIGH (ref 3.8–10.5)
WBC # FLD AUTO: 14.36 K/UL — HIGH (ref 3.8–10.5)

## 2018-06-07 PROCEDURE — 76815 OB US LIMITED FETUS(S): CPT | Mod: 26

## 2018-06-07 RX ADMIN — Medication 1 TABLET(S): at 11:34

## 2018-06-07 RX ADMIN — PIPERACILLIN AND TAZOBACTAM 25 GRAM(S): 4; .5 INJECTION, POWDER, LYOPHILIZED, FOR SOLUTION INTRAVENOUS at 23:13

## 2018-06-07 RX ADMIN — HEPARIN SODIUM 5000 UNIT(S): 5000 INJECTION INTRAVENOUS; SUBCUTANEOUS at 21:14

## 2018-06-07 RX ADMIN — Medication 100 MILLIGRAM(S): at 06:53

## 2018-06-07 RX ADMIN — PIPERACILLIN AND TAZOBACTAM 25 GRAM(S): 4; .5 INJECTION, POWDER, LYOPHILIZED, FOR SOLUTION INTRAVENOUS at 14:06

## 2018-06-07 RX ADMIN — PIPERACILLIN AND TAZOBACTAM 25 GRAM(S): 4; .5 INJECTION, POWDER, LYOPHILIZED, FOR SOLUTION INTRAVENOUS at 06:53

## 2018-06-08 ENCOUNTER — ASOB RESULT (OUTPATIENT)
Age: 38
End: 2018-06-08

## 2018-06-08 ENCOUNTER — APPOINTMENT (OUTPATIENT)
Dept: ANTEPARTUM | Facility: HOSPITAL | Age: 38
End: 2018-06-08
Payer: COMMERCIAL

## 2018-06-08 LAB
BASOPHILS # BLD AUTO: 0.03 K/UL — SIGNIFICANT CHANGE UP (ref 0–0.2)
BASOPHILS NFR BLD AUTO: 0.3 % — SIGNIFICANT CHANGE UP (ref 0–2)
EOSINOPHIL # BLD AUTO: 0.06 K/UL — SIGNIFICANT CHANGE UP (ref 0–0.5)
EOSINOPHIL NFR BLD AUTO: 0.5 % — SIGNIFICANT CHANGE UP (ref 0–6)
HCT VFR BLD CALC: 27.6 % — LOW (ref 34.5–45)
HGB BLD-MCNC: 9.4 G/DL — LOW (ref 11.5–15.5)
IMM GRANULOCYTES # BLD AUTO: 0.09 # — SIGNIFICANT CHANGE UP
IMM GRANULOCYTES NFR BLD AUTO: 0.8 % — SIGNIFICANT CHANGE UP (ref 0–1.5)
LYMPHOCYTES # BLD AUTO: 1.09 K/UL — SIGNIFICANT CHANGE UP (ref 1–3.3)
LYMPHOCYTES # BLD AUTO: 9.2 % — LOW (ref 13–44)
MCHC RBC-ENTMCNC: 29.8 PG — SIGNIFICANT CHANGE UP (ref 27–34)
MCHC RBC-ENTMCNC: 34.1 % — SIGNIFICANT CHANGE UP (ref 32–36)
MCV RBC AUTO: 87.6 FL — SIGNIFICANT CHANGE UP (ref 80–100)
MONOCYTES # BLD AUTO: 0.63 K/UL — SIGNIFICANT CHANGE UP (ref 0–0.9)
MONOCYTES NFR BLD AUTO: 5.3 % — SIGNIFICANT CHANGE UP (ref 2–14)
NEUTROPHILS # BLD AUTO: 9.92 K/UL — HIGH (ref 1.8–7.4)
NEUTROPHILS NFR BLD AUTO: 83.9 % — HIGH (ref 43–77)
NRBC # FLD: 0 — SIGNIFICANT CHANGE UP
PLATELET # BLD AUTO: 224 K/UL — SIGNIFICANT CHANGE UP (ref 150–400)
PMV BLD: 10.1 FL — SIGNIFICANT CHANGE UP (ref 7–13)
RBC # BLD: 3.15 M/UL — LOW (ref 3.8–5.2)
RBC # FLD: 12.3 % — SIGNIFICANT CHANGE UP (ref 10.3–14.5)
WBC # BLD: 11.82 K/UL — HIGH (ref 3.8–10.5)
WBC # FLD AUTO: 11.82 K/UL — HIGH (ref 3.8–10.5)

## 2018-06-08 PROCEDURE — 99232 SBSQ HOSP IP/OBS MODERATE 35: CPT

## 2018-06-08 PROCEDURE — 76815 OB US LIMITED FETUS(S): CPT | Mod: 26

## 2018-06-08 RX ADMIN — Medication 1 TABLET(S): at 12:24

## 2018-06-08 RX ADMIN — PIPERACILLIN AND TAZOBACTAM 25 GRAM(S): 4; .5 INJECTION, POWDER, LYOPHILIZED, FOR SOLUTION INTRAVENOUS at 13:55

## 2018-06-08 RX ADMIN — PIPERACILLIN AND TAZOBACTAM 25 GRAM(S): 4; .5 INJECTION, POWDER, LYOPHILIZED, FOR SOLUTION INTRAVENOUS at 21:24

## 2018-06-08 RX ADMIN — HEPARIN SODIUM 5000 UNIT(S): 5000 INJECTION INTRAVENOUS; SUBCUTANEOUS at 21:24

## 2018-06-08 RX ADMIN — PIPERACILLIN AND TAZOBACTAM 25 GRAM(S): 4; .5 INJECTION, POWDER, LYOPHILIZED, FOR SOLUTION INTRAVENOUS at 06:26

## 2018-06-08 RX ADMIN — HEPARIN SODIUM 5000 UNIT(S): 5000 INJECTION INTRAVENOUS; SUBCUTANEOUS at 10:36

## 2018-06-08 RX ADMIN — Medication 100 MILLIGRAM(S): at 06:26

## 2018-06-08 NOTE — PROGRESS NOTE ADULT - ASSESSMENT
37/F  currently 18 weeks pregnant who presented with sepsis likely due to chorioamnionitis. No other obvious source.   Blood culture  identified peptoniphilus x 2 (formerly Peptostreptococcus)  which cleared.  US with oligohydramnios.  Afebrile and WBC trending down.  Seems to have stabilized for now but still at risk for sepsis.    Would continue Zosyn q 8 hours over weekend.   will discuss long term plan for antibiotic tx early next week.    ID service will follow over weekend.

## 2018-06-08 NOTE — DIETITIAN INITIAL EVALUATION ADULT. - ENERGY NEEDS
Weight: 116# (52.7kg) (per patient report, standing weight taken during admission)   Height: 63 inches  BMI: 20.5kg/m^2  IBW: 115# (52.3kg) +/-10%

## 2018-06-08 NOTE — DIETITIAN INITIAL EVALUATION ADULT. - NS FNS WEIGHT USED FOR CALC
usual/108# (49kg) (pre-pregnancy weight) Estimated energy needs: 2513-3655 calories (38-40 calories/kg)

## 2018-06-08 NOTE — PROGRESS NOTE ADULT - SUBJECTIVE AND OBJECTIVE BOX
37y old  Female who presents with a chief complaint of fevers in pregnancy       Interval history:  notes mild abd cramps.  2 BMs today.  Feels better overall.  no fever, chills.      Allergies:   Kefzol (Hives; Rash; Chills)      piperacillin/tazobactam IVPB. 3.375 every 8 hours        REVIEW OF SYSTEMS:  No chest pain or palpitations  No SOB  No dysuria   No rash.     Vital Signs Last 24 Hrs  T(F): 98.4 (06-08-18 @ 17:51), Max: 98.7 (06-08-18 @ 14:05)  HR: 81 (06-08-18 @ 17:51)  BP: 120/66 (06-08-18 @ 17:51)  RR: 17 (06-08-18 @ 17:51)  SpO2: 100% (06-08-18 @ 17:51) (98% - 100%)    PHYSICAL EXAM:  lying comfortably in bed. non toxic   Cardiovascular: S1S2 normal.   Lungs: Good air entry B/L lung fields.  Gastrointestinal: soft, nondistended, gravid uterus. BS+  Extremities: no edema or rash  IV left hand no erythema                            9.4    11.82 )-----------( 224      ( 08 Jun 2018 06:30 )             27.6                 Culture - Blood (05.31.18 @ 20:45)    Culture - Blood:   NO ORGANISMS ISOLATED    Specimen Source: BLOOD VENOUS                Culture - Blood (05.31.18 @ 20:45)    Culture - Blood:   NO ORGANISMS ISOLATED    Specimen Source: BLOOD PERIPHERAL        Culture - Blood (05.31.18 @ 04:14)    Culture - Blood:   ************************************************  PLEASE TAKE NOTE:  CORRECTED REPORT -  AN ORGANISM IN THIS CULTURE WAS ORIGINALLY IDENTIFIED AS:        CLOSTRIDIUM DIFFICILE  THE IDENTIFICATION HAS BEEN CHANGED TO:          PEPTONIPHILUS ASACCHAROLYTICUS  CONTACT MICRO LAB IF FURTHER INFORMATION IS DESIRED  ************************************************  RESULT CALLED TO / READ BACK:   /Y  DATE / TIME CALLED: 06/04/18 1354  CALLED BY: BILL REYES  Culture - Blood (05.31.18 @ 04:14)        Culture - Blood:   **PEPTONIPHILUS ASACCHAROLYTICUS**

## 2018-06-08 NOTE — DIETITIAN INITIAL EVALUATION ADULT. - NS AS NUTRI INTERV MEALS SNACK
General/healthful diet/1. Continue regular diet. 2. Continue prenatal multivitamin daily. 3. Continue colace PRN. 4. Monitor weights, labs, BM's, skin integrity, p.o. intake.

## 2018-06-08 NOTE — DIETITIAN INITIAL EVALUATION ADULT. - OTHER INFO
Initial Dietitian Evaluation 2/2 to extended length of stay. Per chart review patient 38 yo female  currently 17 weeks pregnant with sepsis likely due to chorioamnionitis. Patient reports at baseline she has good appetite and PO intake. Eating a healthy diet consisting of chicken, lentils, fruits, dark greens and vegetables. Patient takes a prenatal multivitamin with probiotics daily. NKFA. During admission patient with 4 days of poor PO intake due to low appetite and nausea. However, patient reports her appetite and intake are improving. Patient reports some episodes of nausea likely related to medications but no vomiting noted. Patient on colace to alleviate constipation. Reviewed general healthy nutrition for pregnancy. Patient amenable to education. No questions/concerns voiced at this time.

## 2018-06-09 LAB
BASOPHILS # BLD AUTO: 0.03 K/UL — SIGNIFICANT CHANGE UP (ref 0–0.2)
BASOPHILS NFR BLD AUTO: 0.3 % — SIGNIFICANT CHANGE UP (ref 0–2)
EOSINOPHIL # BLD AUTO: 0.09 K/UL — SIGNIFICANT CHANGE UP (ref 0–0.5)
EOSINOPHIL NFR BLD AUTO: 0.8 % — SIGNIFICANT CHANGE UP (ref 0–6)
HCT VFR BLD CALC: 27.1 % — LOW (ref 34.5–45)
HGB BLD-MCNC: 9.1 G/DL — LOW (ref 11.5–15.5)
IMM GRANULOCYTES # BLD AUTO: 0.07 # — SIGNIFICANT CHANGE UP
IMM GRANULOCYTES NFR BLD AUTO: 0.7 % — SIGNIFICANT CHANGE UP (ref 0–1.5)
LYMPHOCYTES # BLD AUTO: 1.29 K/UL — SIGNIFICANT CHANGE UP (ref 1–3.3)
LYMPHOCYTES # BLD AUTO: 12.1 % — LOW (ref 13–44)
MCHC RBC-ENTMCNC: 30.3 PG — SIGNIFICANT CHANGE UP (ref 27–34)
MCHC RBC-ENTMCNC: 33.6 % — SIGNIFICANT CHANGE UP (ref 32–36)
MCV RBC AUTO: 90.3 FL — SIGNIFICANT CHANGE UP (ref 80–100)
MONOCYTES # BLD AUTO: 0.5 K/UL — SIGNIFICANT CHANGE UP (ref 0–0.9)
MONOCYTES NFR BLD AUTO: 4.7 % — SIGNIFICANT CHANGE UP (ref 2–14)
NEUTROPHILS # BLD AUTO: 8.65 K/UL — HIGH (ref 1.8–7.4)
NEUTROPHILS NFR BLD AUTO: 81.4 % — HIGH (ref 43–77)
NRBC # FLD: 0 — SIGNIFICANT CHANGE UP
PLATELET # BLD AUTO: 256 K/UL — SIGNIFICANT CHANGE UP (ref 150–400)
PMV BLD: 10.2 FL — SIGNIFICANT CHANGE UP (ref 7–13)
RBC # BLD: 3 M/UL — LOW (ref 3.8–5.2)
RBC # FLD: 12.2 % — SIGNIFICANT CHANGE UP (ref 10.3–14.5)
WBC # BLD: 10.63 K/UL — HIGH (ref 3.8–10.5)
WBC # FLD AUTO: 10.63 K/UL — HIGH (ref 3.8–10.5)

## 2018-06-09 RX ADMIN — HEPARIN SODIUM 5000 UNIT(S): 5000 INJECTION INTRAVENOUS; SUBCUTANEOUS at 10:46

## 2018-06-09 RX ADMIN — PIPERACILLIN AND TAZOBACTAM 25 GRAM(S): 4; .5 INJECTION, POWDER, LYOPHILIZED, FOR SOLUTION INTRAVENOUS at 22:46

## 2018-06-09 RX ADMIN — PIPERACILLIN AND TAZOBACTAM 25 GRAM(S): 4; .5 INJECTION, POWDER, LYOPHILIZED, FOR SOLUTION INTRAVENOUS at 14:16

## 2018-06-09 RX ADMIN — HEPARIN SODIUM 5000 UNIT(S): 5000 INJECTION INTRAVENOUS; SUBCUTANEOUS at 22:46

## 2018-06-09 RX ADMIN — Medication 1 TABLET(S): at 18:29

## 2018-06-09 RX ADMIN — PIPERACILLIN AND TAZOBACTAM 25 GRAM(S): 4; .5 INJECTION, POWDER, LYOPHILIZED, FOR SOLUTION INTRAVENOUS at 06:09

## 2018-06-10 LAB
-  CANDIDA ALBICANS: SIGNIFICANT CHANGE UP
-  CANDIDA GLABRATA: SIGNIFICANT CHANGE UP
-  CANDIDA KRUSEI: SIGNIFICANT CHANGE UP
-  CANDIDA PARAPSILOSIS: SIGNIFICANT CHANGE UP
-  CANDIDA TROPICALIS: SIGNIFICANT CHANGE UP
-  COAGULASE NEGATIVE STAPHYLOCOCCUS: SIGNIFICANT CHANGE UP
-  K. PNEUMONIAE GROUP: SIGNIFICANT CHANGE UP
-  KPC RESISTANCE GENE: SIGNIFICANT CHANGE UP
-  STREPTOCOCCUS SP. (NOT GRP A, B OR S PNEUMONIAE): SIGNIFICANT CHANGE UP
A BAUMANNII DNA SPEC QL NAA+PROBE: SIGNIFICANT CHANGE UP
BACTERIA BLD CULT: SIGNIFICANT CHANGE UP
BASOPHILS # BLD AUTO: 0.05 K/UL — SIGNIFICANT CHANGE UP (ref 0–0.2)
BASOPHILS NFR BLD AUTO: 0.5 % — SIGNIFICANT CHANGE UP (ref 0–2)
E CLOAC COMP DNA BLD POS QL NAA+PROBE: SIGNIFICANT CHANGE UP
E COLI DNA BLD POS QL NAA+NON-PROBE: SIGNIFICANT CHANGE UP
ENTEROCOC DNA BLD POS QL NAA+NON-PROBE: SIGNIFICANT CHANGE UP
ENTEROCOC DNA BLD POS QL NAA+NON-PROBE: SIGNIFICANT CHANGE UP
EOSINOPHIL # BLD AUTO: 0.13 K/UL — SIGNIFICANT CHANGE UP (ref 0–0.5)
EOSINOPHIL NFR BLD AUTO: 1.2 % — SIGNIFICANT CHANGE UP (ref 0–6)
GP B STREP DNA BLD POS QL NAA+NON-PROBE: SIGNIFICANT CHANGE UP
HAEM INFLU DNA BLD POS QL NAA+NON-PROBE: SIGNIFICANT CHANGE UP
HCT VFR BLD CALC: 27.3 % — LOW (ref 34.5–45)
HGB BLD-MCNC: 9.2 G/DL — LOW (ref 11.5–15.5)
IMM GRANULOCYTES # BLD AUTO: 0.06 # — SIGNIFICANT CHANGE UP
IMM GRANULOCYTES NFR BLD AUTO: 0.6 % — SIGNIFICANT CHANGE UP (ref 0–1.5)
K OXYTOCA DNA BLD POS QL NAA+NON-PROBE: SIGNIFICANT CHANGE UP
L MONOCYTOG DNA BLD POS QL NAA+NON-PROBE: SIGNIFICANT CHANGE UP
LYMPHOCYTES # BLD AUTO: 1.29 K/UL — SIGNIFICANT CHANGE UP (ref 1–3.3)
LYMPHOCYTES # BLD AUTO: 11.9 % — LOW (ref 13–44)
MCHC RBC-ENTMCNC: 30.3 PG — SIGNIFICANT CHANGE UP (ref 27–34)
MCHC RBC-ENTMCNC: 33.7 % — SIGNIFICANT CHANGE UP (ref 32–36)
MCV RBC AUTO: 89.8 FL — SIGNIFICANT CHANGE UP (ref 80–100)
MONOCYTES # BLD AUTO: 0.59 K/UL — SIGNIFICANT CHANGE UP (ref 0–0.9)
MONOCYTES NFR BLD AUTO: 5.4 % — SIGNIFICANT CHANGE UP (ref 2–14)
MRSA SPEC QL CULT: SIGNIFICANT CHANGE UP
MSSA DNA SPEC QL NAA+PROBE: SIGNIFICANT CHANGE UP
N MEN ISLT CULT: SIGNIFICANT CHANGE UP
NEUTROPHILS # BLD AUTO: 8.71 K/UL — HIGH (ref 1.8–7.4)
NEUTROPHILS NFR BLD AUTO: 80.4 % — HIGH (ref 43–77)
NRBC # FLD: 0 — SIGNIFICANT CHANGE UP
ORGANISM # SPEC MICROSCOPIC CNT: SIGNIFICANT CHANGE UP
ORGANISM # SPEC MICROSCOPIC CNT: SIGNIFICANT CHANGE UP
P AERUGINOSA DNA BLD POS NAA+NON-PROBE: SIGNIFICANT CHANGE UP
PLATELET # BLD AUTO: 264 K/UL — SIGNIFICANT CHANGE UP (ref 150–400)
PMV BLD: 10.2 FL — SIGNIFICANT CHANGE UP (ref 7–13)
RBC # BLD: 3.04 M/UL — LOW (ref 3.8–5.2)
RBC # FLD: 12.3 % — SIGNIFICANT CHANGE UP (ref 10.3–14.5)
S MARCESCENS DNA BLD POS NAA+NON-PROBE: SIGNIFICANT CHANGE UP
S PNEUM DNA BLD POS QL NAA+NON-PROBE: SIGNIFICANT CHANGE UP
S PYO DNA BLD POS QL NAA+NON-PROBE: SIGNIFICANT CHANGE UP
WBC # BLD: 10.83 K/UL — HIGH (ref 3.8–10.5)
WBC # FLD AUTO: 10.83 K/UL — HIGH (ref 3.8–10.5)

## 2018-06-10 PROCEDURE — 99232 SBSQ HOSP IP/OBS MODERATE 35: CPT

## 2018-06-10 RX ADMIN — HEPARIN SODIUM 5000 UNIT(S): 5000 INJECTION INTRAVENOUS; SUBCUTANEOUS at 21:03

## 2018-06-10 RX ADMIN — PIPERACILLIN AND TAZOBACTAM 25 GRAM(S): 4; .5 INJECTION, POWDER, LYOPHILIZED, FOR SOLUTION INTRAVENOUS at 14:11

## 2018-06-10 RX ADMIN — HEPARIN SODIUM 5000 UNIT(S): 5000 INJECTION INTRAVENOUS; SUBCUTANEOUS at 10:50

## 2018-06-10 RX ADMIN — Medication 1 TABLET(S): at 14:11

## 2018-06-10 RX ADMIN — PIPERACILLIN AND TAZOBACTAM 25 GRAM(S): 4; .5 INJECTION, POWDER, LYOPHILIZED, FOR SOLUTION INTRAVENOUS at 22:40

## 2018-06-10 RX ADMIN — PIPERACILLIN AND TAZOBACTAM 25 GRAM(S): 4; .5 INJECTION, POWDER, LYOPHILIZED, FOR SOLUTION INTRAVENOUS at 06:27

## 2018-06-10 NOTE — PROGRESS NOTE ADULT - ASSESSMENT
37/F  currently 18 weeks pregnant who presented with sepsis likely due to chorioamnionitis. No other obvious source.  Blood culture  identified peptoniphilus x 2 (formerly Peptostreptococcus)  which cleared.  US with oligohydramnios.  Afebrile and WBC trending down.  Seems to have stabilized for now but still at risk for sepsis.  BCX clear   Today, continues to appear well with no leukocytosis, no fevers  - Continue Zosyn 3.375g q 8  - Further antibiotic plan to be determined    Eh Mckenzie MD  Pager [phone]  After 5pm and on weekends call [phone]

## 2018-06-10 NOTE — PROGRESS NOTE ADULT - SUBJECTIVE AND OBJECTIVE BOX
CC: F/U for ? Chorio    Saw/spoke to patient. Patient overall well. No pain, having vaginal discharge (not increased). No fevers, no chills.    Allergies  Kefzol (Hives; Rash; Chills)    ANTIMICROBIALS:  piperacillin/tazobactam IVPB. 3.375 every 8 hours    PE:    Vital Signs Last 24 Hrs  T(C): 37 (10 Shawn 2018 06:30), Max: 37.2 (09 Jun 2018 18:25)  T(F): 98.6 (10 Shawn 2018 06:30), Max: 99 (09 Jun 2018 18:25)  HR: 70 (10 Shawn 2018 06:30) (70 - 82)  BP: 99/57 (10 Shawn 2018 06:30) (90/52 - 110/64)  RR: 18 (10 Shawn 2018 06:30) (16 - 18)  SpO2: 99% (10 Shawn 2018 06:30) (98% - 99%)    Gen: AOx3, NAD, non-toxic, well appearing  CV: S1+S2 normal, no murmurs, nontachycardic  Resp: Clear bilat, no resp distress, no crackles/wheezes  Abd: Soft, nontender, +BS, gravid  Ext: No LE edema, no wounds      LABS:                          9.2    10.83 )-----------( 264      ( 10 Shawn 2018 06:48 )             27.3     MICROBIOLOGY:    BLOOD PERIPHERAL  05-31-18 NGTD    BLOOD PERIPHERAL  05-31-18 --  --  Peptnophilus (?  C diff)    (otherwise reviewed)    RADIOLOGY:    No new available

## 2018-06-11 ENCOUNTER — APPOINTMENT (OUTPATIENT)
Age: 38
End: 2018-06-11
Payer: COMMERCIAL

## 2018-06-11 ENCOUNTER — ASOB RESULT (OUTPATIENT)
Age: 38
End: 2018-06-11

## 2018-06-11 LAB
-  CANDIDA ALBICANS: SIGNIFICANT CHANGE UP
-  CANDIDA GLABRATA: SIGNIFICANT CHANGE UP
-  CANDIDA KRUSEI: SIGNIFICANT CHANGE UP
-  CANDIDA PARAPSILOSIS: SIGNIFICANT CHANGE UP
-  CANDIDA TROPICALIS: SIGNIFICANT CHANGE UP
-  COAGULASE NEGATIVE STAPHYLOCOCCUS: SIGNIFICANT CHANGE UP
-  K. PNEUMONIAE GROUP: SIGNIFICANT CHANGE UP
-  KPC RESISTANCE GENE: SIGNIFICANT CHANGE UP
-  STREPTOCOCCUS SP. (NOT GRP A, B OR S PNEUMONIAE): SIGNIFICANT CHANGE UP
A BAUMANNII DNA SPEC QL NAA+PROBE: SIGNIFICANT CHANGE UP
BACTERIA BLD CULT: SIGNIFICANT CHANGE UP
BASOPHILS # BLD AUTO: 0.03 K/UL — SIGNIFICANT CHANGE UP (ref 0–0.2)
BASOPHILS NFR BLD AUTO: 0.3 % — SIGNIFICANT CHANGE UP (ref 0–2)
E CLOAC COMP DNA BLD POS QL NAA+PROBE: SIGNIFICANT CHANGE UP
E COLI DNA BLD POS QL NAA+NON-PROBE: SIGNIFICANT CHANGE UP
ENTEROCOC DNA BLD POS QL NAA+NON-PROBE: SIGNIFICANT CHANGE UP
ENTEROCOC DNA BLD POS QL NAA+NON-PROBE: SIGNIFICANT CHANGE UP
EOSINOPHIL # BLD AUTO: 0.18 K/UL — SIGNIFICANT CHANGE UP (ref 0–0.5)
EOSINOPHIL NFR BLD AUTO: 1.5 % — SIGNIFICANT CHANGE UP (ref 0–6)
GP B STREP DNA BLD POS QL NAA+NON-PROBE: SIGNIFICANT CHANGE UP
HAEM INFLU DNA BLD POS QL NAA+NON-PROBE: SIGNIFICANT CHANGE UP
HCT VFR BLD CALC: 26 % — LOW (ref 34.5–45)
HGB BLD-MCNC: 9.1 G/DL — LOW (ref 11.5–15.5)
IMM GRANULOCYTES # BLD AUTO: 0.06 # — SIGNIFICANT CHANGE UP
IMM GRANULOCYTES NFR BLD AUTO: 0.5 % — SIGNIFICANT CHANGE UP (ref 0–1.5)
K OXYTOCA DNA BLD POS QL NAA+NON-PROBE: SIGNIFICANT CHANGE UP
L MONOCYTOG DNA BLD POS QL NAA+NON-PROBE: SIGNIFICANT CHANGE UP
LYMPHOCYTES # BLD AUTO: 1.43 K/UL — SIGNIFICANT CHANGE UP (ref 1–3.3)
LYMPHOCYTES # BLD AUTO: 11.9 % — LOW (ref 13–44)
MCHC RBC-ENTMCNC: 30.2 PG — SIGNIFICANT CHANGE UP (ref 27–34)
MCHC RBC-ENTMCNC: 35 % — SIGNIFICANT CHANGE UP (ref 32–36)
MCV RBC AUTO: 86.4 FL — SIGNIFICANT CHANGE UP (ref 80–100)
MONOCYTES # BLD AUTO: 0.63 K/UL — SIGNIFICANT CHANGE UP (ref 0–0.9)
MONOCYTES NFR BLD AUTO: 5.3 % — SIGNIFICANT CHANGE UP (ref 2–14)
MRSA SPEC QL CULT: SIGNIFICANT CHANGE UP
MSSA DNA SPEC QL NAA+PROBE: SIGNIFICANT CHANGE UP
N MEN ISLT CULT: SIGNIFICANT CHANGE UP
NEUTROPHILS # BLD AUTO: 9.67 K/UL — HIGH (ref 1.8–7.4)
NEUTROPHILS NFR BLD AUTO: 80.5 % — HIGH (ref 43–77)
NRBC # FLD: 0 — SIGNIFICANT CHANGE UP
ORGANISM # SPEC MICROSCOPIC CNT: SIGNIFICANT CHANGE UP
ORGANISM # SPEC MICROSCOPIC CNT: SIGNIFICANT CHANGE UP
P AERUGINOSA DNA BLD POS NAA+NON-PROBE: SIGNIFICANT CHANGE UP
PLATELET # BLD AUTO: 280 K/UL — SIGNIFICANT CHANGE UP (ref 150–400)
PMV BLD: 10.3 FL — SIGNIFICANT CHANGE UP (ref 7–13)
RBC # BLD: 3.01 M/UL — LOW (ref 3.8–5.2)
RBC # FLD: 12.2 % — SIGNIFICANT CHANGE UP (ref 10.3–14.5)
S MARCESCENS DNA BLD POS NAA+NON-PROBE: SIGNIFICANT CHANGE UP
S PNEUM DNA BLD POS QL NAA+NON-PROBE: SIGNIFICANT CHANGE UP
S PYO DNA BLD POS QL NAA+NON-PROBE: SIGNIFICANT CHANGE UP
WBC # BLD: 12 K/UL — HIGH (ref 3.8–10.5)
WBC # FLD AUTO: 12 K/UL — HIGH (ref 3.8–10.5)

## 2018-06-11 PROCEDURE — 76815 OB US LIMITED FETUS(S): CPT | Mod: 26

## 2018-06-11 PROCEDURE — 99232 SBSQ HOSP IP/OBS MODERATE 35: CPT

## 2018-06-11 RX ORDER — CALCIUM CARBONATE 500(1250)
1 TABLET ORAL EVERY 4 HOURS
Qty: 0 | Refills: 0 | Status: DISCONTINUED | OUTPATIENT
Start: 2018-06-11 | End: 2018-06-14

## 2018-06-11 RX ADMIN — HEPARIN SODIUM 5000 UNIT(S): 5000 INJECTION INTRAVENOUS; SUBCUTANEOUS at 22:16

## 2018-06-11 RX ADMIN — Medication 100 MILLIGRAM(S): at 18:16

## 2018-06-11 RX ADMIN — Medication 1 TABLET(S): at 13:56

## 2018-06-11 RX ADMIN — Medication 1 TABLET(S): at 21:47

## 2018-06-11 RX ADMIN — HEPARIN SODIUM 5000 UNIT(S): 5000 INJECTION INTRAVENOUS; SUBCUTANEOUS at 10:30

## 2018-06-11 RX ADMIN — PIPERACILLIN AND TAZOBACTAM 25 GRAM(S): 4; .5 INJECTION, POWDER, LYOPHILIZED, FOR SOLUTION INTRAVENOUS at 23:36

## 2018-06-11 RX ADMIN — PIPERACILLIN AND TAZOBACTAM 25 GRAM(S): 4; .5 INJECTION, POWDER, LYOPHILIZED, FOR SOLUTION INTRAVENOUS at 06:09

## 2018-06-11 RX ADMIN — PIPERACILLIN AND TAZOBACTAM 25 GRAM(S): 4; .5 INJECTION, POWDER, LYOPHILIZED, FOR SOLUTION INTRAVENOUS at 15:38

## 2018-06-11 NOTE — PROGRESS NOTE ADULT - ASSESSMENT
37/F  currently 18 weeks pregnant who presented with sepsis likely due to chorioamnionitis. No other obvious source.  Blood culture  identified peptoniphilus x 2 (formerly Peptostreptococcus)  which cleared.  US with oligohydramnios.  Afebrile and WBC slightly elevated..  BCX clear .  Still at risk for sepsis.    suggest;    - Continue Zosyn 3.375g q 8 x total 14 days, then would probably continue antibiotic po.  - Await today's US findings

## 2018-06-11 NOTE — PROGRESS NOTE ADULT - SUBJECTIVE AND OBJECTIVE BOX
CC: F/U for ? Chorio    Feels well.  no cramps.  yellow vaginal discharge. No fevers, no chills.    Allergies  Kefzol (Hives; Rash; Chills)    ANTIMICROBIALS:  piperacillin/tazobactam IVPB. 3.375 every 8 hours    PE:    Vital Signs Last 24 Hrs  T(F): 98.5 (06-11-18 @ 17:41), Max: 98.8 (06-11-18 @ 14:31)  HR: 85 (06-11-18 @ 17:41)  BP: 127/60 (06-11-18 @ 17:41)  RR: 18 (06-11-18 @ 17:41)  SpO2: 100% (06-11-18 @ 17:41) (97% - 100%)    Gen: AOx3, NAD, non-toxic, well appearing  CV: S1+S2 normal, no murmurs  Resp: Clear bilat, no resp distress, no crackles/wheezes  Abd: Soft, nontender, +BS, gravid  Ext: No LE edema, no wounds IV left hand      LABS:                                      9.1    12.00 )-----------( 280      ( 11 Jun 2018 06:07 )             26.0         MICROBIOLOGY:    Culture - Blood (05.31.18 @ 20:45)    Culture - Blood:   NO ORGANISMS ISOLATED    Specimen Source: BLOOD VENOUS    Culture - Blood (05.31.18 @ 20:45)    Culture - Blood:   NO ORGANISMS ISOLATED    Specimen Source: BLOOD PERIPHERAL    Culture - Blood (05.31.18 @ 04:14)    -  Staphylococcus aureus: - NOTDETECT RANDALL    -  Multidrug (KPC pos) resistant organism: - NOTDETECT RANDALL    Culture - Blood:   ***Blood Panel PCR results on this specimen are available  approximately 3 hours after the Gram stain result***  Gram stain, PCR, and/or culture results may not always  correspond due to difference in methodologies  ------------------------------------------------------------  This PCR assay was performed using [employer].  The  following targets are tested for:  Enterococcus, vancomycin  resistant enterococci, Listeria monocytogenes,  coagulase  negative staphylococci, S. aureus, methicillin resistant S.  aureus, Streptococcus agalactiae (Group B), S. pneumoniae,  S. pyogenes (Group A), Acinetobacter baumannii, Enterobacter  cloacae, E. coli, Klebsiella oxytoca, K. pneumoniae, Proteus  sp., Serratia marcescens, Haemophilus influenzae, Neisseria  meningitidis, Pseudomonas aeruginosa, Candida albicans, C.  glabrata, C. krusei, C. parapsilosis, C. tropicalis and the  KPC resistance gene.  **NOTE: Due to technical problems, Proteus sp. will NOT be  reported as part of the BCID paneluntil further notice.    Culture - Blood:   ************************************************  PLEASE TAKE NOTE:  CORRECTED REPORT -  AN ORGANISM IN THIS CULTURE WAS ORIGINALLY IDENTIFIED AS:        CLOSTRIDIUM DIFFICILE  THE IDENTIFICATION HAS BEEN CHANGED TO:  **        PEPTONIPHILUS ASACCHAROLYTICUS  CONTACT MICRO LAB IF FURTHER INFORMATION IS DESIRED  ************************************************  RESULT CALLED TO / READ BACK:   /Y  DATE / TIME CALLED: 06/04/18 5064  CALLED BY: BILL REYES  CULTURE IN PROGRESS, FURTHERREPORT TO FOLLOW.    -  Methicillin resistant Staphylococcus aureus (MRSA): - NOTDETECT RANDALL    -  Coagulase negative Staphylococcus: - NOTDETECT RANDALL    -  Enterococcus species: - NOTDETECT RANDALL    -  Vancomycin resistant Enterococcus sp.: - NOTDETECT RANDALL    -  Escherichia coli: - NOTDETECT ARNDALL    -  Klebsiella oxytoca: - NOTDETECT RANDALL    -  Klebsiella pneumoniae: - NOTDETECT RANDALL    -  Serratia marcescens: - NOTDETECT RANDALL    -  Haemophilus influenzae: - NOTDETECT RANDALL    -  Listeria monocytogenes: - NOTDETECT RANDALL    -  Neisseria meningitidis: - NOTDETECT RANDALL    -  Pseudomonas aeruginosa: - NOTDETECT RANDALL    -  Acinetobacter baumanii: - NOTDETECT RANDALL    -  Enterobacter cloacae complex: - NOTDETECT RANDALL    -  Streptococcus sp. (Not Grp A, B or S pneumoniae): - NOTDETECT RANDALL    -  Streptococcus agalactiae (Group B): - NOTDETECT RANDALL    -  Streptococcus pyogenes (Group A): - NOTDETECT RANDALL    -  Streptococcus pneumoniae: - NOTDETECT RANDALL    -  Candida albicans: - NOTDETECT RANDALL    -  Candida glabrata: - NOTDETECT RANDALL    -  Cristina krusei: - NOTDETECT RANDALL    -  Candida parapsilosis: - NOTDETECT RANDALL    -  Candida tropicalis: - NOTDETECT RANDALL    Specimen Source: BLOOD PERIPHERAL    Organism: BLOOD CULTURE PCR  ***Blood Panel PCR results on this specimen are available  approximately 3 hours after the Gram stain result***  Gram stain, PCR, and/or culture results may not always  correspond due to difference in methodologies  ------------------------------------------------------------  This PCR assay was performed using [employer].  The  following targets are tested for:  Enterococcus, vancomycin  resistant enterococci, Listeria monocytogenes,  coagulase  negative staphylococci, S. aureus, methicillin resistant S.  aureus, Streptococcus agalactiae (Group B), S. pneumoniae,  S. pyogenes (Group A), Acinetobacter baumannii, Enterobacter  cloacae, E. coli, Klebsiella oxytoca, K. pneumoniae, Proteus  sp., Serratia marcescens, Haemophilus influenzae, Neisseria  meningitidis, Pseudomonas aeruginosa, Candida albicans, C.  glabrata, C. krusei, C. parapsilosis, C. tropicalis and the  KPC resistance gene.  **NOTE: Due to technical problems, Proteus sp. will NOT be  reported as part of the BCID panel until further notice.  Culture - Urine (05.30.18 @ 00:26)    Culture - Urine:   NO GROWTH AT 24 HOURS    Specimen Source: URINE MIDSTREAM      Organism: Peptostreptococcus species    Gram Stain Blood:   ***** CRITICAL RESULT *****  PERSON CALLED / READ-BACK: ANNE-MARIE LESTER RN/Y  DATE / TIME CALLED: 06/01/18 8942  CALLED BY: LUCIA BRIONES  GPCCL^Gram Pos Cocci In Clusters  AFTER: 42 HOURS INCUBATION  BOTTLE: ANAEROBIC BOTTLE    Organism Identification: BLOOD CULTURE PCR  Peptostreptococcus species    Method Type: PCR            RADIOLOGY:

## 2018-06-12 LAB
BASOPHILS # BLD AUTO: 0.04 K/UL — SIGNIFICANT CHANGE UP (ref 0–0.2)
BASOPHILS NFR BLD AUTO: 0.4 % — SIGNIFICANT CHANGE UP (ref 0–2)
EOSINOPHIL # BLD AUTO: 0.22 K/UL — SIGNIFICANT CHANGE UP (ref 0–0.5)
EOSINOPHIL NFR BLD AUTO: 1.9 % — SIGNIFICANT CHANGE UP (ref 0–6)
HCT VFR BLD CALC: 26.6 % — LOW (ref 34.5–45)
HGB BLD-MCNC: 9.2 G/DL — LOW (ref 11.5–15.5)
IMM GRANULOCYTES # BLD AUTO: 0.05 # — SIGNIFICANT CHANGE UP
IMM GRANULOCYTES NFR BLD AUTO: 0.4 % — SIGNIFICANT CHANGE UP (ref 0–1.5)
LYMPHOCYTES # BLD AUTO: 1.26 K/UL — SIGNIFICANT CHANGE UP (ref 1–3.3)
LYMPHOCYTES # BLD AUTO: 11 % — LOW (ref 13–44)
MCHC RBC-ENTMCNC: 29.8 PG — SIGNIFICANT CHANGE UP (ref 27–34)
MCHC RBC-ENTMCNC: 34.6 % — SIGNIFICANT CHANGE UP (ref 32–36)
MCV RBC AUTO: 86.1 FL — SIGNIFICANT CHANGE UP (ref 80–100)
MONOCYTES # BLD AUTO: 0.56 K/UL — SIGNIFICANT CHANGE UP (ref 0–0.9)
MONOCYTES NFR BLD AUTO: 4.9 % — SIGNIFICANT CHANGE UP (ref 2–14)
NEUTROPHILS # BLD AUTO: 9.28 K/UL — HIGH (ref 1.8–7.4)
NEUTROPHILS NFR BLD AUTO: 81.4 % — HIGH (ref 43–77)
NRBC # FLD: 0 — SIGNIFICANT CHANGE UP
PLATELET # BLD AUTO: 290 K/UL — SIGNIFICANT CHANGE UP (ref 150–400)
PMV BLD: 10 FL — SIGNIFICANT CHANGE UP (ref 7–13)
RBC # BLD: 3.09 M/UL — LOW (ref 3.8–5.2)
RBC # FLD: 12.2 % — SIGNIFICANT CHANGE UP (ref 10.3–14.5)
WBC # BLD: 11.41 K/UL — HIGH (ref 3.8–10.5)
WBC # FLD AUTO: 11.41 K/UL — HIGH (ref 3.8–10.5)

## 2018-06-12 RX ADMIN — Medication 100 MILLIGRAM(S): at 09:15

## 2018-06-12 RX ADMIN — HEPARIN SODIUM 5000 UNIT(S): 5000 INJECTION INTRAVENOUS; SUBCUTANEOUS at 22:02

## 2018-06-12 RX ADMIN — HEPARIN SODIUM 5000 UNIT(S): 5000 INJECTION INTRAVENOUS; SUBCUTANEOUS at 10:20

## 2018-06-12 RX ADMIN — PIPERACILLIN AND TAZOBACTAM 25 GRAM(S): 4; .5 INJECTION, POWDER, LYOPHILIZED, FOR SOLUTION INTRAVENOUS at 06:56

## 2018-06-12 RX ADMIN — PIPERACILLIN AND TAZOBACTAM 25 GRAM(S): 4; .5 INJECTION, POWDER, LYOPHILIZED, FOR SOLUTION INTRAVENOUS at 14:35

## 2018-06-12 RX ADMIN — Medication 1 TABLET(S): at 14:38

## 2018-06-12 RX ADMIN — PIPERACILLIN AND TAZOBACTAM 25 GRAM(S): 4; .5 INJECTION, POWDER, LYOPHILIZED, FOR SOLUTION INTRAVENOUS at 22:37

## 2018-06-12 NOTE — CHART NOTE - NSCHARTNOTEFT_GEN_A_CORE
Patient asked about PO hydration in order to increase her amniotic fluid. Discussed with patient that it is more physiologic to have PO hydration, and that if she is tolerating PO it is better to increase her PO intake. Explained to patient that if she has any changes in her status needs IV fluids, we will re-evaluate the medical need for fluids.     d/w Dr. Dena Higuera PGY-2

## 2018-06-12 NOTE — PROVIDER CONTACT NOTE (OTHER) - RECOMMENDATIONS
Encouraged to maintain hydration by po fluids. Reported pt request to Dr Higuera for evaluation.  Pt given urine collection container for commode.  Will record voids.

## 2018-06-12 NOTE — PROVIDER CONTACT NOTE (OTHER) - ASSESSMENT
Pt VSS, pt taking po fluids and food without difficulty.  Pt reports regular frequent voids clear yellow urine.

## 2018-06-12 NOTE — PROVIDER CONTACT NOTE (OTHER) - BACKGROUND
G 4 P 0212 , Pt NIKI 2.1 at 18 6/7 weeks. Pt admitted for fever, r/o bacteremia and r/o chorioamnionitis.

## 2018-06-13 ENCOUNTER — APPOINTMENT (OUTPATIENT)
Dept: ANTEPARTUM | Facility: CLINIC | Age: 38
End: 2018-06-13
Payer: COMMERCIAL

## 2018-06-13 ENCOUNTER — ASOB RESULT (OUTPATIENT)
Age: 38
End: 2018-06-13

## 2018-06-13 LAB
BASOPHILS # BLD AUTO: 0.04 K/UL — SIGNIFICANT CHANGE UP (ref 0–0.2)
BASOPHILS NFR BLD AUTO: 0.4 % — SIGNIFICANT CHANGE UP (ref 0–2)
EOSINOPHIL # BLD AUTO: 0.19 K/UL — SIGNIFICANT CHANGE UP (ref 0–0.5)
EOSINOPHIL NFR BLD AUTO: 1.8 % — SIGNIFICANT CHANGE UP (ref 0–6)
HCT VFR BLD CALC: 25.2 % — LOW (ref 34.5–45)
HGB BLD-MCNC: 9 G/DL — LOW (ref 11.5–15.5)
IMM GRANULOCYTES # BLD AUTO: 0.06 # — SIGNIFICANT CHANGE UP
IMM GRANULOCYTES NFR BLD AUTO: 0.6 % — SIGNIFICANT CHANGE UP (ref 0–1.5)
LYMPHOCYTES # BLD AUTO: 1.25 K/UL — SIGNIFICANT CHANGE UP (ref 1–3.3)
LYMPHOCYTES # BLD AUTO: 11.8 % — LOW (ref 13–44)
MCHC RBC-ENTMCNC: 31 PG — SIGNIFICANT CHANGE UP (ref 27–34)
MCHC RBC-ENTMCNC: 35.7 % — SIGNIFICANT CHANGE UP (ref 32–36)
MCV RBC AUTO: 86.9 FL — SIGNIFICANT CHANGE UP (ref 80–100)
MONOCYTES # BLD AUTO: 0.65 K/UL — SIGNIFICANT CHANGE UP (ref 0–0.9)
MONOCYTES NFR BLD AUTO: 6.2 % — SIGNIFICANT CHANGE UP (ref 2–14)
NEUTROPHILS # BLD AUTO: 8.37 K/UL — HIGH (ref 1.8–7.4)
NEUTROPHILS NFR BLD AUTO: 79.2 % — HIGH (ref 43–77)
NRBC # FLD: 0 — SIGNIFICANT CHANGE UP
PLATELET # BLD AUTO: 297 K/UL — SIGNIFICANT CHANGE UP (ref 150–400)
PMV BLD: 10.2 FL — SIGNIFICANT CHANGE UP (ref 7–13)
RBC # BLD: 2.9 M/UL — LOW (ref 3.8–5.2)
RBC # FLD: 12.2 % — SIGNIFICANT CHANGE UP (ref 10.3–14.5)
WBC # BLD: 10.56 K/UL — HIGH (ref 3.8–10.5)
WBC # FLD AUTO: 10.56 K/UL — HIGH (ref 3.8–10.5)

## 2018-06-13 PROCEDURE — 99232 SBSQ HOSP IP/OBS MODERATE 35: CPT

## 2018-06-13 PROCEDURE — 76811 OB US DETAILED SNGL FETUS: CPT | Mod: 26

## 2018-06-13 PROCEDURE — 99254 IP/OBS CNSLTJ NEW/EST MOD 60: CPT | Mod: 25

## 2018-06-13 RX ORDER — AMOXICILLIN 250 MG/5ML
500 SUSPENSION, RECONSTITUTED, ORAL (ML) ORAL EVERY 12 HOURS
Qty: 0 | Refills: 0 | Status: DISCONTINUED | OUTPATIENT
Start: 2018-06-13 | End: 2018-06-14

## 2018-06-13 RX ADMIN — Medication 975 MILLIGRAM(S): at 09:36

## 2018-06-13 RX ADMIN — Medication 500 MILLIGRAM(S): at 17:58

## 2018-06-13 RX ADMIN — Medication 100 MILLIGRAM(S): at 17:59

## 2018-06-13 RX ADMIN — Medication 975 MILLIGRAM(S): at 19:00

## 2018-06-13 NOTE — PROGRESS NOTE ADULT - SUBJECTIVE AND OBJECTIVE BOX
CC: F/U for ? Chorio    IV access problematic.  Otherwise feels OK.  No fevers, no chills.    Allergies  Kefzol (Hives; Rash; Chills)    ANTIMICROBIALS:  piperacillin/tazobactam IVPB. 3.375 every 8 hours    PE:  Vital Signs Last 24 Hrs  T(F): 98.2 (06-13-18 @ 14:21), Max: 98.9 (06-12-18 @ 18:45)  HR: 93 (06-13-18 @ 14:21)  BP: 117/65 (06-13-18 @ 14:21)  RR: 17 (06-13-18 @ 14:21)  SpO2: 99% (06-13-18 @ 14:21) (98% - 100%)    Gen: AOx3, NAD, non-toxic, well appearing  CV: S1+S2 normal, no murmurs  Resp: Clear bilat, no resp distress, no crackles/wheezes  Abd: Soft, nontender, +BS, gravid  Ext: no rash.      LABS:                                   9.0    10.56 )-----------( 297      ( 13 Jun 2018 04:17 )             25.2             MICROBIOLOGY:    Culture - Blood (05.31.18 @ 20:45)    Culture - Blood:   NO ORGANISMS ISOLATED    Specimen Source: BLOOD VENOUS    Culture - Blood (05.31.18 @ 20:45)    Culture - Blood:   NO ORGANISMS ISOLATED    Specimen Source: BLOOD PERIPHERAL    Culture - Blood (05.31.18 @ 04:14)    -  Staphylococcus aureus: - NOTDETECT RANDALL    -  Multidrug (KPC pos) resistant organism: - NOTDETECT RANDALL    Culture - Blood:   ***Blood Panel PCR results on this specimen are available  approximately 3 hours after the Gram stain result***  Gram stain, PCR, and/or culture results may not always  correspond due to difference in methodologies  ------------------------------------------------------------  This PCR assay was performed using [employer].  The  following targets are tested for:  Enterococcus, vancomycin  resistant enterococci, Listeria monocytogenes,  coagulase  negative staphylococci, S. aureus, methicillin resistant S.  aureus, Streptococcus agalactiae (Group B), S. pneumoniae,  S. pyogenes (Group A), Acinetobacter baumannii, Enterobacter  cloacae, E. coli, Klebsiella oxytoca, K. pneumoniae, Proteus  sp., Serratia marcescens, Haemophilus influenzae, Neisseria  meningitidis, Pseudomonas aeruginosa, Candida albicans, C.  glabrata, C. krusei, C. parapsilosis, C. tropicalis and the  KPC resistance gene.  **NOTE: Due to technical problems, Proteus sp. will NOT be  reported as part of the BCID paneluntil further notice.    Culture - Blood:   ************************************************  PLEASE TAKE NOTE:  CORRECTED REPORT -  AN ORGANISM IN THIS CULTURE WAS ORIGINALLY IDENTIFIED AS:        CLOSTRIDIUM DIFFICILE  THE IDENTIFICATION HAS BEEN CHANGED TO:  **        PEPTONIPHILUS ASACCHAROLYTICUS  CONTACT MICRO LAB IF FURTHER INFORMATION IS DESIRED  ************************************************  RESULT CALLED TO / READ BACK:   /Y  DATE / TIME CALLED: 06/04/18 4082  CALLED BY: BILL REYES  CULTURE IN PROGRESS, FURTHERREPORT TO FOLLOW.    -  Methicillin resistant Staphylococcus aureus (MRSA): - NOTDETECT RANDALL    -  Coagulase negative Staphylococcus: - NOTDETECT RANDALL    -  Enterococcus species: - NOTDETECT RANDALL    -  Vancomycin resistant Enterococcus sp.: - NOTDETECT RANDALL    -  Escherichia coli: - NOTDETECT RANDALL    -  Klebsiella oxytoca: - NOTDETECT RANDALL    -  Klebsiella pneumoniae: - NOTDETECT RANDALL    -  Serratia marcescens: - NOTDETECT RANDALL    -  Haemophilus influenzae: - NOTDETECT RANDALL    -  Listeria monocytogenes: - NOTDETECT RANDALL    -  Neisseria meningitidis: - NOTDETECT RANDALL    -  Pseudomonas aeruginosa: - NOTDETECT RANDALL    -  Acinetobacter baumanii: - NOTDETECT RANDALL    -  Enterobacter cloacae complex: - NOTDETECT RANDALL    -  Streptococcus sp. (Not Grp A, B or S pneumoniae): - NOTDETECT RANDALL    -  Streptococcus agalactiae (Group B): - NOTDETECT RANDALL    -  Streptococcus pyogenes (Group A): - NOTDETECT RANDALL    -  Streptococcus pneumoniae: - NOTDETECT RANDALL    -  Candida albicans: - NOTDETECT RANDALL    -  Candida glabrata: - NOTDETECT RANDALL    -  Cristina krusei: - NOTDETECT RANDALL    -  Candida parapsilosis: - NOTDETECT RANDALL    -  Candida tropicalis: - NOTDETECT RANDALL    Specimen Source: BLOOD PERIPHERAL    Organism: BLOOD CULTURE PCR  ***Blood Panel PCR results on this specimen are available  approximately 3 hours after the Gram stain result***  Gram stain, PCR, and/or culture results may not always  correspond due to difference in methodologies  ------------------------------------------------------------  This PCR assay was performed using [employer].  The  following targets are tested for:  Enterococcus, vancomycin  resistant enterococci, Listeria monocytogenes,  coagulase  negative staphylococci, S. aureus, methicillin resistant S.  aureus, Streptococcus agalactiae (Group B), S. pneumoniae,  S. pyogenes (Group A), Acinetobacter baumannii, Enterobacter  cloacae, E. coli, Klebsiella oxytoca, K. pneumoniae, Proteus  sp., Serratia marcescens, Haemophilus influenzae, Neisseria  meningitidis, Pseudomonas aeruginosa, Candida albicans, C.  glabrata, C. krusei, C. parapsilosis, C. tropicalis and the  KPC resistance gene.  **NOTE: Due to technical problems, Proteus sp. will NOT be  reported as part of the BCID panel until further notice.  Culture - Urine (05.30.18 @ 00:26)    Culture - Urine:   NO GROWTH AT 24 HOURS    Specimen Source: URINE MIDSTREAM      Organism: Peptostreptococcus species    Gram Stain Blood:   ***** CRITICAL RESULT *****  PERSON CALLED / READ-BACK: ANNE-MARIE LESTER RN/Y  DATE / TIME CALLED: 06/01/18 4657  CALLED BY: LUCIA BRIONES  GPCCL^Gram Pos Cocci In Clusters  AFTER: 42 HOURS INCUBATION  BOTTLE: ANAEROBIC BOTTLE    Organism Identification: BLOOD CULTURE PCR  Peptostreptococcus species    Method Type: PCR            RADIOLOGY:

## 2018-06-13 NOTE — PROGRESS NOTE ADULT - ASSESSMENT
37/F  currently 18 weeks pregnant who presented with sepsis likely due to chorioamnionitis. No other obvious source.  Blood culture  identified peptoniphilus x 2 (formerly Peptostreptococcus)  which cleared later on  .  US with oligohydramnios.  Has completed 14 days tx for bacteremia.  With suspected chorioamnionitis feel she is at risk for sepsis and would consider transitioning to suppressive antibiotic  with amoxicillin 500 mg po q 12 hours.  Will continue to follow.

## 2018-06-14 VITALS
TEMPERATURE: 98 F | OXYGEN SATURATION: 98 % | RESPIRATION RATE: 18 BRPM | DIASTOLIC BLOOD PRESSURE: 65 MMHG | SYSTOLIC BLOOD PRESSURE: 113 MMHG | HEART RATE: 83 BPM

## 2018-06-14 LAB
BASOPHILS # BLD AUTO: 0.04 K/UL — SIGNIFICANT CHANGE UP (ref 0–0.2)
BASOPHILS NFR BLD AUTO: 0.3 % — SIGNIFICANT CHANGE UP (ref 0–2)
EOSINOPHIL # BLD AUTO: 0.11 K/UL — SIGNIFICANT CHANGE UP (ref 0–0.5)
EOSINOPHIL NFR BLD AUTO: 0.9 % — SIGNIFICANT CHANGE UP (ref 0–6)
HCT VFR BLD CALC: 28.6 % — LOW (ref 34.5–45)
HGB BLD-MCNC: 9.9 G/DL — LOW (ref 11.5–15.5)
IMM GRANULOCYTES # BLD AUTO: 0.06 # — SIGNIFICANT CHANGE UP
IMM GRANULOCYTES NFR BLD AUTO: 0.5 % — SIGNIFICANT CHANGE UP (ref 0–1.5)
LYMPHOCYTES # BLD AUTO: 1.06 K/UL — SIGNIFICANT CHANGE UP (ref 1–3.3)
LYMPHOCYTES # BLD AUTO: 8.9 % — LOW (ref 13–44)
MCHC RBC-ENTMCNC: 30.2 PG — SIGNIFICANT CHANGE UP (ref 27–34)
MCHC RBC-ENTMCNC: 34.6 % — SIGNIFICANT CHANGE UP (ref 32–36)
MCV RBC AUTO: 87.2 FL — SIGNIFICANT CHANGE UP (ref 80–100)
MONOCYTES # BLD AUTO: 0.49 K/UL — SIGNIFICANT CHANGE UP (ref 0–0.9)
MONOCYTES NFR BLD AUTO: 4.1 % — SIGNIFICANT CHANGE UP (ref 2–14)
NEUTROPHILS # BLD AUTO: 10.11 K/UL — HIGH (ref 1.8–7.4)
NEUTROPHILS NFR BLD AUTO: 85.3 % — HIGH (ref 43–77)
NRBC # FLD: 0 — SIGNIFICANT CHANGE UP
PLATELET # BLD AUTO: 311 K/UL — SIGNIFICANT CHANGE UP (ref 150–400)
PMV BLD: 10 FL — SIGNIFICANT CHANGE UP (ref 7–13)
RBC # BLD: 3.28 M/UL — LOW (ref 3.8–5.2)
RBC # FLD: 12.3 % — SIGNIFICANT CHANGE UP (ref 10.3–14.5)
WBC # BLD: 11.87 K/UL — HIGH (ref 3.8–10.5)
WBC # FLD AUTO: 11.87 K/UL — HIGH (ref 3.8–10.5)

## 2018-06-14 RX ORDER — AMOXICILLIN 250 MG/5ML
1 SUSPENSION, RECONSTITUTED, ORAL (ML) ORAL
Qty: 28 | Refills: 0 | OUTPATIENT
Start: 2018-06-14 | End: 2018-06-27

## 2018-06-14 RX ADMIN — Medication 1 TABLET(S): at 10:42

## 2018-06-14 RX ADMIN — Medication 500 MILLIGRAM(S): at 06:21

## 2018-06-20 ENCOUNTER — INPATIENT (INPATIENT)
Facility: HOSPITAL | Age: 38
LOS: 0 days | Discharge: HOME | End: 2018-06-21
Attending: OBSTETRICS & GYNECOLOGY | Admitting: OBSTETRICS & GYNECOLOGY
Payer: COMMERCIAL

## 2018-06-20 ENCOUNTER — RESULT REVIEW (OUTPATIENT)
Age: 38
End: 2018-06-20

## 2018-06-20 VITALS — WEIGHT: 114.64 LBS | HEIGHT: 63 IN

## 2018-06-20 DIAGNOSIS — O36.4XX0 MATERNAL CARE FOR INTRAUTERINE DEATH, NOT APPLICABLE OR UNSPECIFIED: ICD-10-CM

## 2018-06-20 LAB
BASOPHILS # BLD AUTO: 0.05 K/UL — SIGNIFICANT CHANGE UP (ref 0–0.2)
BASOPHILS NFR BLD AUTO: 0.4 % — SIGNIFICANT CHANGE UP (ref 0–2)
BLD GP AB SCN SERPL QL: NEGATIVE — SIGNIFICANT CHANGE UP
EOSINOPHIL # BLD AUTO: 0.05 K/UL — SIGNIFICANT CHANGE UP (ref 0–0.5)
EOSINOPHIL NFR BLD AUTO: 0.4 % — SIGNIFICANT CHANGE UP (ref 0–6)
HCT VFR BLD CALC: 27.8 % — LOW (ref 34.5–45)
HGB BLD-MCNC: 9.5 G/DL — LOW (ref 11.5–15.5)
IMM GRANULOCYTES # BLD AUTO: 0.04 # — SIGNIFICANT CHANGE UP
IMM GRANULOCYTES NFR BLD AUTO: 0.3 % — SIGNIFICANT CHANGE UP (ref 0–1.5)
LYMPHOCYTES # BLD AUTO: 1.43 K/UL — SIGNIFICANT CHANGE UP (ref 1–3.3)
LYMPHOCYTES # BLD AUTO: 11.9 % — LOW (ref 13–44)
MCHC RBC-ENTMCNC: 30.7 PG — SIGNIFICANT CHANGE UP (ref 27–34)
MCHC RBC-ENTMCNC: 34.2 % — SIGNIFICANT CHANGE UP (ref 32–36)
MCV RBC AUTO: 90 FL — SIGNIFICANT CHANGE UP (ref 80–100)
MONOCYTES # BLD AUTO: 0.69 K/UL — SIGNIFICANT CHANGE UP (ref 0–0.9)
MONOCYTES NFR BLD AUTO: 5.8 % — SIGNIFICANT CHANGE UP (ref 2–14)
NEUTROPHILS # BLD AUTO: 9.73 K/UL — HIGH (ref 1.8–7.4)
NEUTROPHILS NFR BLD AUTO: 81.2 % — HIGH (ref 43–77)
NRBC # FLD: 0 — SIGNIFICANT CHANGE UP
PLATELET # BLD AUTO: 289 K/UL — SIGNIFICANT CHANGE UP (ref 150–400)
PMV BLD: 10.4 FL — SIGNIFICANT CHANGE UP (ref 7–13)
RBC # BLD: 3.09 M/UL — LOW (ref 3.8–5.2)
RBC # FLD: 12.5 % — SIGNIFICANT CHANGE UP (ref 10.3–14.5)
RH IG SCN BLD-IMP: POSITIVE — SIGNIFICANT CHANGE UP
WBC # BLD: 11.99 K/UL — HIGH (ref 3.8–10.5)
WBC # FLD AUTO: 11.99 K/UL — HIGH (ref 3.8–10.5)

## 2018-06-20 PROCEDURE — 88304 TISSUE EXAM BY PATHOLOGIST: CPT | Mod: 26

## 2018-06-20 RX ORDER — DIPHENHYDRAMINE HCL 50 MG
25 CAPSULE ORAL EVERY 6 HOURS
Qty: 0 | Refills: 0 | Status: DISCONTINUED | OUTPATIENT
Start: 2018-06-21 | End: 2018-06-21

## 2018-06-20 RX ORDER — OXYTOCIN 10 UNIT/ML
333.33 VIAL (ML) INJECTION
Qty: 20 | Refills: 0 | Status: COMPLETED | OUTPATIENT
Start: 2018-06-20

## 2018-06-20 RX ORDER — ACETAMINOPHEN 500 MG
975 TABLET ORAL ONCE
Qty: 0 | Refills: 0 | Status: COMPLETED | OUTPATIENT
Start: 2018-06-20 | End: 2018-06-20

## 2018-06-20 RX ORDER — SODIUM CHLORIDE 9 MG/ML
3 INJECTION INTRAMUSCULAR; INTRAVENOUS; SUBCUTANEOUS EVERY 8 HOURS
Qty: 0 | Refills: 0 | Status: DISCONTINUED | OUTPATIENT
Start: 2018-06-21 | End: 2018-06-21

## 2018-06-20 RX ORDER — ACETAMINOPHEN 500 MG
975 TABLET ORAL EVERY 6 HOURS
Qty: 0 | Refills: 0 | Status: DISCONTINUED | OUTPATIENT
Start: 2018-06-21 | End: 2018-06-21

## 2018-06-20 RX ORDER — OXYCODONE HYDROCHLORIDE 5 MG/1
5 TABLET ORAL EVERY 4 HOURS
Qty: 0 | Refills: 0 | Status: DISCONTINUED | OUTPATIENT
Start: 2018-06-21 | End: 2018-06-21

## 2018-06-20 RX ORDER — GLYCERIN ADULT
1 SUPPOSITORY, RECTAL RECTAL AT BEDTIME
Qty: 0 | Refills: 0 | Status: DISCONTINUED | OUTPATIENT
Start: 2018-06-21 | End: 2018-06-21

## 2018-06-20 RX ORDER — AMPICILLIN TRIHYDRATE 250 MG
CAPSULE ORAL
Qty: 0 | Refills: 0 | Status: DISCONTINUED | OUTPATIENT
Start: 2018-06-20 | End: 2018-06-21

## 2018-06-20 RX ORDER — SODIUM CHLORIDE 9 MG/ML
1000 INJECTION, SOLUTION INTRAVENOUS ONCE
Qty: 0 | Refills: 0 | Status: DISCONTINUED | OUTPATIENT
Start: 2018-06-20 | End: 2018-06-21

## 2018-06-20 RX ORDER — CITRIC ACID/SODIUM CITRATE 300-500 MG
15 SOLUTION, ORAL ORAL EVERY 4 HOURS
Qty: 0 | Refills: 0 | Status: DISCONTINUED | OUTPATIENT
Start: 2018-06-20 | End: 2018-06-21

## 2018-06-20 RX ORDER — AMPICILLIN TRIHYDRATE 250 MG
2 CAPSULE ORAL ONCE
Qty: 0 | Refills: 0 | Status: COMPLETED | OUTPATIENT
Start: 2018-06-20 | End: 2018-06-20

## 2018-06-20 RX ORDER — NALOXONE HYDROCHLORIDE 4 MG/.1ML
0.1 SPRAY NASAL
Qty: 0 | Refills: 0 | Status: DISCONTINUED | OUTPATIENT
Start: 2018-06-20 | End: 2018-06-21

## 2018-06-20 RX ORDER — AMPICILLIN TRIHYDRATE 250 MG
2 CAPSULE ORAL EVERY 6 HOURS
Qty: 0 | Refills: 0 | Status: DISCONTINUED | OUTPATIENT
Start: 2018-06-21 | End: 2018-06-21

## 2018-06-20 RX ORDER — BUTORPHANOL TARTRATE 2 MG/ML
2 INJECTION, SOLUTION INTRAMUSCULAR; INTRAVENOUS ONCE
Qty: 0 | Refills: 0 | Status: DISCONTINUED | OUTPATIENT
Start: 2018-06-20 | End: 2018-06-20

## 2018-06-20 RX ORDER — PRAMOXINE HYDROCHLORIDE 150 MG/15G
1 AEROSOL, FOAM RECTAL EVERY 4 HOURS
Qty: 0 | Refills: 0 | Status: DISCONTINUED | OUTPATIENT
Start: 2018-06-21 | End: 2018-06-21

## 2018-06-20 RX ORDER — DOCUSATE SODIUM 100 MG
100 CAPSULE ORAL
Qty: 0 | Refills: 0 | Status: DISCONTINUED | OUTPATIENT
Start: 2018-06-21 | End: 2018-06-21

## 2018-06-20 RX ORDER — IBUPROFEN 200 MG
600 TABLET ORAL EVERY 6 HOURS
Qty: 0 | Refills: 0 | Status: DISCONTINUED | OUTPATIENT
Start: 2018-06-21 | End: 2018-06-21

## 2018-06-20 RX ORDER — OXYTOCIN 10 UNIT/ML
333.33 VIAL (ML) INJECTION
Qty: 20 | Refills: 0 | Status: COMPLETED | OUTPATIENT
Start: 2018-06-20 | End: 2018-06-20

## 2018-06-20 RX ORDER — INFLUENZA VIRUS VACCINE 15; 15; 15; 15 UG/.5ML; UG/.5ML; UG/.5ML; UG/.5ML
0.5 SUSPENSION INTRAMUSCULAR ONCE
Qty: 0 | Refills: 0 | Status: DISCONTINUED | OUTPATIENT
Start: 2018-06-20 | End: 2018-06-21

## 2018-06-20 RX ORDER — SIMETHICONE 80 MG/1
80 TABLET, CHEWABLE ORAL EVERY 6 HOURS
Qty: 0 | Refills: 0 | Status: DISCONTINUED | OUTPATIENT
Start: 2018-06-21 | End: 2018-06-21

## 2018-06-20 RX ORDER — ONDANSETRON 8 MG/1
4 TABLET, FILM COATED ORAL EVERY 6 HOURS
Qty: 0 | Refills: 0 | Status: DISCONTINUED | OUTPATIENT
Start: 2018-06-20 | End: 2018-06-21

## 2018-06-20 RX ORDER — DIBUCAINE 1 %
1 OINTMENT (GRAM) RECTAL EVERY 4 HOURS
Qty: 0 | Refills: 0 | Status: DISCONTINUED | OUTPATIENT
Start: 2018-06-21 | End: 2018-06-21

## 2018-06-20 RX ORDER — FENTANYL CITRATE 50 UG/ML
30 INJECTION INTRAVENOUS
Qty: 0 | Refills: 0 | Status: DISCONTINUED | OUTPATIENT
Start: 2018-06-20 | End: 2018-06-21

## 2018-06-20 RX ORDER — AER TRAVELER 0.5 G/1
1 SOLUTION RECTAL; TOPICAL EVERY 4 HOURS
Qty: 0 | Refills: 0 | Status: DISCONTINUED | OUTPATIENT
Start: 2018-06-21 | End: 2018-06-21

## 2018-06-20 RX ORDER — MAGNESIUM HYDROXIDE 400 MG/1
30 TABLET, CHEWABLE ORAL
Qty: 0 | Refills: 0 | Status: DISCONTINUED | OUTPATIENT
Start: 2018-06-21 | End: 2018-06-21

## 2018-06-20 RX ORDER — OXYCODONE HYDROCHLORIDE 5 MG/1
5 TABLET ORAL
Qty: 0 | Refills: 0 | Status: DISCONTINUED | OUTPATIENT
Start: 2018-06-21 | End: 2018-06-21

## 2018-06-20 RX ORDER — FENTANYL CITRATE 50 UG/ML
20 INJECTION INTRAVENOUS
Qty: 0 | Refills: 0 | Status: DISCONTINUED | OUTPATIENT
Start: 2018-06-20 | End: 2018-06-21

## 2018-06-20 RX ORDER — SODIUM CHLORIDE 9 MG/ML
1000 INJECTION, SOLUTION INTRAVENOUS
Qty: 0 | Refills: 0 | Status: DISCONTINUED | OUTPATIENT
Start: 2018-06-20 | End: 2018-06-21

## 2018-06-20 RX ORDER — OXYTOCIN 10 UNIT/ML
41.67 VIAL (ML) INJECTION
Qty: 20 | Refills: 0 | Status: DISCONTINUED | OUTPATIENT
Start: 2018-06-21 | End: 2018-06-21

## 2018-06-20 RX ORDER — ALPRAZOLAM 0.25 MG
0.5 TABLET ORAL ONCE
Qty: 0 | Refills: 0 | Status: DISCONTINUED | OUTPATIENT
Start: 2018-06-20 | End: 2018-06-21

## 2018-06-20 RX ADMIN — Medication 216 GRAM(S): at 19:52

## 2018-06-20 RX ADMIN — Medication 975 MILLIGRAM(S): at 18:00

## 2018-06-20 RX ADMIN — BUTORPHANOL TARTRATE 2 MILLIGRAM(S): 2 INJECTION, SOLUTION INTRAMUSCULAR; INTRAVENOUS at 19:04

## 2018-06-20 RX ADMIN — BUTORPHANOL TARTRATE 2 MILLIGRAM(S): 2 INJECTION, SOLUTION INTRAMUSCULAR; INTRAVENOUS at 19:51

## 2018-06-20 RX ADMIN — SODIUM CHLORIDE 250 MILLILITER(S): 9 INJECTION, SOLUTION INTRAVENOUS at 19:53

## 2018-06-20 RX ADMIN — SODIUM CHLORIDE 250 MILLILITER(S): 9 INJECTION, SOLUTION INTRAVENOUS at 16:58

## 2018-06-20 RX ADMIN — Medication 975 MILLIGRAM(S): at 17:24

## 2018-06-21 ENCOUNTER — TRANSCRIPTION ENCOUNTER (OUTPATIENT)
Age: 38
End: 2018-06-21

## 2018-06-21 VITALS
TEMPERATURE: 98 F | SYSTOLIC BLOOD PRESSURE: 132 MMHG | HEART RATE: 76 BPM | OXYGEN SATURATION: 100 % | RESPIRATION RATE: 18 BRPM | DIASTOLIC BLOOD PRESSURE: 65 MMHG

## 2018-06-21 LAB
GRAM STN WND: SIGNIFICANT CHANGE UP
GRAM STN WND: SIGNIFICANT CHANGE UP
HCT VFR BLD CALC: 25.6 % — LOW (ref 34.5–45)
HGB BLD-MCNC: 9 G/DL — LOW (ref 11.5–15.5)
MCHC RBC-ENTMCNC: 31 PG — SIGNIFICANT CHANGE UP (ref 27–34)
MCHC RBC-ENTMCNC: 35.2 % — SIGNIFICANT CHANGE UP (ref 32–36)
MCV RBC AUTO: 88.3 FL — SIGNIFICANT CHANGE UP (ref 80–100)
NRBC # FLD: 0 — SIGNIFICANT CHANGE UP
PLATELET # BLD AUTO: 222 K/UL — SIGNIFICANT CHANGE UP (ref 150–400)
PMV BLD: 9.8 FL — SIGNIFICANT CHANGE UP (ref 7–13)
RBC # BLD: 2.9 M/UL — LOW (ref 3.8–5.2)
RBC # FLD: 12.4 % — SIGNIFICANT CHANGE UP (ref 10.3–14.5)
SPECIMEN SOURCE: SIGNIFICANT CHANGE UP
SPECIMEN SOURCE: SIGNIFICANT CHANGE UP
WBC # BLD: 13.35 K/UL — HIGH (ref 3.8–10.5)
WBC # FLD AUTO: 13.35 K/UL — HIGH (ref 3.8–10.5)

## 2018-06-21 PROCEDURE — 93010 ELECTROCARDIOGRAM REPORT: CPT

## 2018-06-21 RX ORDER — ACETAMINOPHEN 500 MG
3 TABLET ORAL
Qty: 0 | Refills: 0 | COMMUNITY
Start: 2018-06-21

## 2018-06-21 RX ORDER — SODIUM CHLORIDE 9 MG/ML
1000 INJECTION, SOLUTION INTRAVENOUS
Qty: 0 | Refills: 0 | Status: DISCONTINUED | OUTPATIENT
Start: 2018-06-21 | End: 2018-06-21

## 2018-06-21 RX ORDER — IBUPROFEN 200 MG
1 TABLET ORAL
Qty: 0 | Refills: 0 | COMMUNITY
Start: 2018-06-21

## 2018-06-21 RX ADMIN — SODIUM CHLORIDE 3 MILLILITER(S): 9 INJECTION INTRAMUSCULAR; INTRAVENOUS; SUBCUTANEOUS at 06:14

## 2018-06-21 RX ADMIN — SODIUM CHLORIDE 125 MILLILITER(S): 9 INJECTION, SOLUTION INTRAVENOUS at 02:19

## 2018-06-21 RX ADMIN — Medication 216 GRAM(S): at 01:53

## 2018-06-21 RX ADMIN — Medication 0.2 MILLIGRAM(S): at 01:53

## 2018-06-21 RX ADMIN — Medication 216 GRAM(S): at 08:03

## 2018-06-21 RX ADMIN — Medication 125 MILLIUNIT(S)/MIN: at 01:40

## 2018-06-21 RX ADMIN — Medication 1000 MILLIUNIT(S)/MIN: at 02:09

## 2018-06-21 RX ADMIN — Medication 0.2 MILLIGRAM(S): at 06:14

## 2018-06-21 RX ADMIN — Medication 0.2 MILLIGRAM(S): at 10:22

## 2018-06-21 NOTE — DISCHARGE NOTE OB - CARE PLAN
Principal Discharge DX:	 delivery after induction of labor  Goal:	normal postpartum course  Assessment and plan of treatment:	normal diet and activity

## 2018-06-21 NOTE — DISCHARGE NOTE ADULT - MEDICATION SUMMARY - MEDICATIONS TO TAKE
I will START or STAY ON the medications listed below when I get home from the hospital:    acetaminophen 325 mg oral tablet  -- 3 tab(s) by mouth every 6 hours  -- Indication: For pain    ibuprofen 600 mg oral tablet  -- 1 tab(s) by mouth every 6 hours  -- Indication: For pain    amoxicillin 500 mg oral tablet  -- 1 tab(s) by mouth every 12 hours   -- Finish all this medication unless otherwise directed by prescriber.    -- Indication: For infection prophylaxis

## 2018-06-21 NOTE — DISCHARGE NOTE ADULT - MEDICATION SUMMARY - MEDICATIONS TO STOP TAKING
I will STOP taking the medications listed below when I get home from the hospital:    HYDROXYprogesterone 250 mg/mL intramuscular solution  -- 1 milliliter(s) intramuscular every 7 days

## 2018-06-21 NOTE — PROGRESS NOTE ADULT - PROBLEM SELECTOR PLAN 1
- consider PO abx 2/2 prior admission for bacteremia  -Continue the current pain medication  -Encourage ambulation  -Encourage regular diet  - eval for D/C    ZEINA Higuera PGY-3

## 2018-06-21 NOTE — DISCHARGE NOTE ADULT - CARE PROVIDER_API CALL
Pascual Roman), MaternalFetal Medicine; Obstetrics and Gynecology  [address] [address] [place] [address]  [address]  [place] [address]  Phone: [phone]  Fax: [phone]

## 2018-06-21 NOTE — DISCHARGE NOTE ADULT - PLAN OF CARE
Return to normal ADLs Regular diet as tolerated, regular activity as tolerated. Take tylenol, motrinas needed for pain control. Nothing per vagina (ie no tampons, douching, or intercourse) until cleared by your doctor. Shower only, no baths. Call your doctor or go to the ED if you have bleeding that does not stop, are unable to urinate, have a fever of greater than 100.4, or your pain is not controlled.

## 2018-06-21 NOTE — DISCHARGE NOTE ADULT - CARE PLAN
Principal Discharge DX:	 delivery after induction of labor  Goal:	Return to normal ADLs  Assessment and plan of treatment:	Regular diet as tolerated, regular activity as tolerated. Take tylenol, motrinas needed for pain control. Nothing per vagina (ie no tampons, douching, or intercourse) until cleared by your doctor. Shower only, no baths. Call your doctor or go to the ED if you have bleeding that does not stop, are unable to urinate, have a fever of greater than 100.4, or your pain is not controlled.

## 2018-06-21 NOTE — DISCHARGE NOTE OB - PATIENT PORTAL LINK FT
You can access the [facility] Patient Portal, offered by [facility], by registering with the following website: [email or web address]

## 2018-06-21 NOTE — PROGRESS NOTE ADULT - SUBJECTIVE AND OBJECTIVE BOX
Pain Service Follow-up  Postop Day  1    S/P  C- Section    T(C): 36.7 (06-21-18 @ 04:15), Max: 37.1 (06-20-18 @ 21:15)  HR: 63 (06-21-18 @ 04:15) (61 - 81)  BP: 106/60 (06-21-18 @ 04:15) (90/55 - 122/65)  RR: 17 (06-21-18 @ 04:15) (16 - 17)  SpO2: 100% (06-21-18 @ 04:15) (98% - 100%)  Wt(kg): --      THERAPY:  IVPCA    Sedation Score:	  [X] Alert	      [  ] Drowsy       [  ] Arousable	[  ] Asleep         [  ] Unresponsive    Side Effects:	  [X] None	      [  ] Nausea       [  ] Pruritus        [  ] Weakness   [  ] Numbness        ASSESSMENT/ PLAN   [ X ] Discontinue         [  ] Continue    [ X ] Documentation and Verification of current medications       Satisfactory Post Anesthetic Course

## 2018-06-21 NOTE — DISCHARGE NOTE ADULT - HOSPITAL COURSE
Patient admitted for induction of labor for anhydramnios in a nonviable fetus. Intrapartum course was unremarkable. Postpartum, patient was afebrile, tolerating PO, ambulating, and lochia was minimal. Patient discharged and instructed to complete 24 hours of PO antibiotics and is to follow up with Dr. Roman for a postpartum check.

## 2018-06-21 NOTE — PROGRESS NOTE ADULT - ASSESSMENT
A/P: 37y   PPD#1 s/p , induction for nonviable fetus.  She is in stable condition without complaints.

## 2018-06-21 NOTE — PROGRESS NOTE ADULT - SUBJECTIVE AND OBJECTIVE BOX
Patient is doing well without complaints. Tolerates regular diet. She states lochia is scant. Ambulating without difficulty. Denies N/V, fevers, chills. Voiding freely. Passing flatus. Pain well controlled with oral pain medications.     Vital Signs Last 24 Hrs  T(C): 36.7 (21 Jun 2018 04:15), Max: 37.1 (20 Jun 2018 21:15)  T(F): 98 (21 Jun 2018 04:15), Max: 98.8 (20 Jun 2018 21:15)  HR: 63 (21 Jun 2018 04:15) (61 - 81)  BP: 106/60 (21 Jun 2018 04:15) (90/55 - 122/65)  BP(mean): --  RR: 17 (21 Jun 2018 04:15) (16 - 17)  SpO2: 100% (21 Jun 2018 04:15) (98% - 100%)    Physical Exam:  General: NAD  Cardio: rrr, no m/g/r  pulm: CTAB  Abdomen: soft, non-tender, non-distended, +BS  Vaginal: deferred  Extremities: No redness/swelling    Labs:                        9.5    11.99 )-----------( 289      ( 20 Jun 2018 16:00 )             27.8             MEDICATIONS  (STANDING):  acetaminophen   Tablet. 975 milliGRAM(s) Oral every 6 hours  ALPRAZolam 0.5 milliGRAM(s) Oral once  ampicillin  IVPB      ampicillin  IVPB 2 Gram(s) IV Intermittent every 6 hours  fentaNYL PCA (50 MICROgram(s)/mL) 30 milliLiter(s) PCA Continuous PCA Continuous  ibuprofen  Tablet 600 milliGRAM(s) Oral every 6 hours  influenza   Vaccine 0.5 milliLiter(s) IntraMuscular once  lactated ringers. 1000 milliLiter(s) (125 mL/Hr) IV Continuous <Continuous>  methylergonovine 0.2 milliGRAM(s) Oral every 4 hours  oxyCODONE    IR 5 milliGRAM(s) Oral every 3 hours  oxytocin Infusion 41.667 milliUNIT(s)/Min (125 mL/Hr) IV Continuous <Continuous>  prenatal multivitamin 1 Tablet(s) Oral daily  sodium chloride 0.9% lock flush 3 milliLiter(s) IV Push every 8 hours

## 2018-06-21 NOTE — DISCHARGE NOTE ADULT - PATIENT PORTAL LINK FT
You can access the [facility] Patient Portal, offered by [facility], by registering with the following website: [email or web address]

## 2018-06-22 LAB
SPECIMEN SOURCE: SIGNIFICANT CHANGE UP
SPECIMEN SOURCE: SIGNIFICANT CHANGE UP

## 2018-06-24 LAB — BACTERIA WND CULT: SIGNIFICANT CHANGE UP

## 2018-06-26 LAB — BACTERIA WND CULT: SIGNIFICANT CHANGE UP

## 2018-07-10 LAB — SURGICAL PATHOLOGY STUDY: SIGNIFICANT CHANGE UP

## 2018-08-18 ENCOUNTER — APPOINTMENT (OUTPATIENT)
Dept: INTERNAL MEDICINE | Facility: CLINIC | Age: 38
End: 2018-08-18
Payer: COMMERCIAL

## 2018-08-18 VITALS
OXYGEN SATURATION: 100 % | HEIGHT: 64 IN | WEIGHT: 113 LBS | BODY MASS INDEX: 19.29 KG/M2 | SYSTOLIC BLOOD PRESSURE: 110 MMHG | TEMPERATURE: 98.1 F | HEART RATE: 65 BPM | DIASTOLIC BLOOD PRESSURE: 68 MMHG

## 2018-08-18 DIAGNOSIS — B35.1 TINEA UNGUIUM: ICD-10-CM

## 2018-08-18 PROCEDURE — 99213 OFFICE O/P EST LOW 20 MIN: CPT | Mod: 25

## 2018-08-18 PROCEDURE — 36415 COLL VENOUS BLD VENIPUNCTURE: CPT

## 2018-08-18 RX ORDER — NITROFURANTOIN (MONOHYDRATE/MACROCRYSTALS) 25; 75 MG/1; MG/1
100 CAPSULE ORAL
Qty: 14 | Refills: 0 | Status: COMPLETED | COMMUNITY
Start: 2018-05-29

## 2018-08-18 RX ORDER — DIAZEPAM 5 MG/1
5 TABLET ORAL
Qty: 5 | Refills: 0 | Status: COMPLETED | COMMUNITY
Start: 2018-06-21

## 2018-08-18 RX ORDER — AMOXICILLIN 500 MG/1
500 CAPSULE ORAL
Qty: 28 | Refills: 0 | Status: COMPLETED | COMMUNITY
Start: 2018-06-14

## 2018-08-18 RX ORDER — AZITHROMYCIN 250 MG/1
250 TABLET, FILM COATED ORAL
Qty: 1 | Refills: 0 | Status: DISCONTINUED | COMMUNITY
Start: 2018-03-27 | End: 2018-08-18

## 2018-08-18 RX ORDER — AMOXICILLIN 875 MG/1
875 TABLET, FILM COATED ORAL
Qty: 14 | Refills: 0 | Status: COMPLETED | COMMUNITY
Start: 2018-05-31

## 2018-08-18 NOTE — PHYSICAL EXAM
[No Acute Distress] : no acute distress [Well Nourished] : well nourished [Well Developed] : well developed [Well-Appearing] : well-appearing [Normal Voice/Communication] : normal voice/communication [Normal Sclera/Conjunctiva] : normal sclera/conjunctiva [PERRL] : pupils equal round and reactive to light [Normal Outer Ear/Nose] : the outer ears and nose were normal in appearance [No JVD] : no jugular venous distention [Supple] : supple [No Respiratory Distress] : no respiratory distress  [No Accessory Muscle Use] : no accessory muscle use [Normal Rate] : normal rate  [No Edema] : there was no peripheral edema [No Extremity Clubbing/Cyanosis] : no extremity clubbing/cyanosis [No Rash] : no rash [Normal Gait] : normal gait [Coordination Grossly Intact] : coordination grossly intact [No Focal Deficits] : no focal deficits [Speech Grossly Normal] : speech grossly normal [Normal Affect] : the affect was normal [Alert and Oriented x3] : oriented to person, place, and time [de-identified] : no stridor [de-identified] : R=16 [de-identified] : has greenish discoloration under nails with yellowing of nails of fingers

## 2018-08-18 NOTE — HEALTH RISK ASSESSMENT
[Intercurrent hospitalizations] : was admitted to the hospital  [No falls in past year] : Patient reported no falls in the past year [] : No [de-identified] : GYN

## 2018-08-18 NOTE — ASSESSMENT
[FreeTextEntry1] : Presents with yellowing of multiple fingernails with greenish discoloration of/under nails.\par ? fungus ? bacterial=pseudomonas\par \par Plans to see derm for scraping/cutting of nails for C&S\par No further tips\par Avoid nail polish - states that she needs to have her nails polished for upcoming  but then will remove polish\par Keep hands/nails clean and dry\par Will check CBC, CMP, HGBA1C\par Trial of terbinafine topically bid\par May need po therapy as well - will check LFT's in preparation for treatment\par RTC for HFU visit and as needed\par To call for any medical issues

## 2018-08-20 LAB
ALBUMIN SERPL ELPH-MCNC: 4.3 G/DL
ALP BLD-CCNC: 41 U/L
ALT SERPL-CCNC: 11 U/L
ANION GAP SERPL CALC-SCNC: 12 MMOL/L
AST SERPL-CCNC: 17 U/L
BASOPHILS # BLD AUTO: 0.01 K/UL
BASOPHILS NFR BLD AUTO: 0.2 %
BILIRUB SERPL-MCNC: 0.5 MG/DL
BUN SERPL-MCNC: 17 MG/DL
CALCIUM SERPL-MCNC: 9.5 MG/DL
CHLORIDE SERPL-SCNC: 102 MMOL/L
CO2 SERPL-SCNC: 25 MMOL/L
CREAT SERPL-MCNC: 0.75 MG/DL
EOSINOPHIL # BLD AUTO: 0.07 K/UL
EOSINOPHIL NFR BLD AUTO: 1.5 %
GLUCOSE SERPL-MCNC: 109 MG/DL
HBA1C MFR BLD HPLC: 4.7 %
HCT VFR BLD CALC: 40.3 %
HGB BLD-MCNC: 12.2 G/DL
IMM GRANULOCYTES NFR BLD AUTO: 0 %
LYMPHOCYTES # BLD AUTO: 1.28 K/UL
LYMPHOCYTES NFR BLD AUTO: 27.4 %
MAN DIFF?: NORMAL
MCHC RBC-ENTMCNC: 28.6 PG
MCHC RBC-ENTMCNC: 30.3 GM/DL
MCV RBC AUTO: 94.4 FL
MONOCYTES # BLD AUTO: 0.37 K/UL
MONOCYTES NFR BLD AUTO: 7.9 %
NEUTROPHILS # BLD AUTO: 2.95 K/UL
NEUTROPHILS NFR BLD AUTO: 63 %
PLATELET # BLD AUTO: 298 K/UL
POTASSIUM SERPL-SCNC: 4.4 MMOL/L
PROT SERPL-MCNC: 6.8 G/DL
RBC # BLD: 4.27 M/UL
RBC # FLD: 13.2 %
SODIUM SERPL-SCNC: 139 MMOL/L
WBC # FLD AUTO: 4.68 K/UL

## 2019-01-08 ENCOUNTER — APPOINTMENT (OUTPATIENT)
Dept: INTERNAL MEDICINE | Facility: CLINIC | Age: 39
End: 2019-01-08

## 2019-06-03 ENCOUNTER — APPOINTMENT (OUTPATIENT)
Dept: INTERNAL MEDICINE | Facility: CLINIC | Age: 39
End: 2019-06-03
Payer: COMMERCIAL

## 2019-06-03 VITALS
BODY MASS INDEX: 18.27 KG/M2 | DIASTOLIC BLOOD PRESSURE: 70 MMHG | HEART RATE: 89 BPM | HEIGHT: 64 IN | WEIGHT: 107 LBS | SYSTOLIC BLOOD PRESSURE: 120 MMHG | TEMPERATURE: 98.7 F | OXYGEN SATURATION: 99 %

## 2019-06-03 DIAGNOSIS — Z87.09 PERSONAL HISTORY OF OTHER DISEASES OF THE RESPIRATORY SYSTEM: ICD-10-CM

## 2019-06-03 LAB — S PYO AG SPEC QL IA: NORMAL

## 2019-06-03 PROCEDURE — 99213 OFFICE O/P EST LOW 20 MIN: CPT | Mod: 25

## 2019-06-03 PROCEDURE — 87880 STREP A ASSAY W/OPTIC: CPT | Mod: QW

## 2019-06-03 RX ORDER — OSELTAMIVIR PHOSPHATE 75 MG/1
75 CAPSULE ORAL
Qty: 10 | Refills: 0 | Status: DISCONTINUED | COMMUNITY
Start: 2019-02-05

## 2019-06-03 RX ORDER — SODIUM PICOSULFATE, MAGNESIUM OXIDE, AND ANHYDROUS CITRIC ACID 10; 3.5; 12 MG/160ML; G/160ML; G/160ML
10-3.5-12 MG-GM LIQUID ORAL
Qty: 320 | Refills: 0 | Status: DISCONTINUED | COMMUNITY
Start: 2019-03-19

## 2019-06-03 NOTE — HISTORY OF PRESENT ILLNESS
[FreeTextEntry8] : Patient comes for an acute visit. \par \par She is here for evaluation of sore throat x 2 weeks. She has otalgia and popping sensation in both ears. Sometimes feels dizzy. She has a mild dry cough since last night. No cp or SOB. She has some back discomfort. No urinary complaints. She noticed a white exudate on her tonsil. No fever or chills. She feels more tired than usual. She did complete a workout at a gym this week. Her daughter had strep throat 2 weeks ago. She is not taking OTC medications.

## 2019-06-03 NOTE — PHYSICAL EXAM
[No Acute Distress] : no acute distress [Well Nourished] : well nourished [Well-Appearing] : well-appearing [PERRL] : pupils equal round and reactive to light [EOMI] : extraocular movements intact [Normal Oropharynx] : the oropharynx was normal [Normal TMs] : both tympanic membranes were normal [Supple] : supple [No Respiratory Distress] : no respiratory distress  [No Lymphadenopathy] : no lymphadenopathy [Clear to Auscultation] : lungs were clear to auscultation bilaterally [Normal Rate] : normal rate  [Regular Rhythm] : with a regular rhythm [Normal S1, S2] : normal S1 and S2 [No Murmur] : no murmur heard [No Edema] : there was no peripheral edema [Soft] : abdomen soft [Non Tender] : non-tender [No Rash] : no rash [Normal Gait] : normal gait [Normal Affect] : the affect was normal [Normal Mood] : the mood was normal [de-identified] : scant cerumen, slightly enlarged tonsils but no erythema or exudate

## 2019-06-03 NOTE — ASSESSMENT
[FreeTextEntry1] : Rapid strep testing was negative. She is not likely contagious at this point. Can try lozenges as needed. Hydrate well. Can resume normal activities. Call office PRN.

## 2019-10-21 ENCOUNTER — TRANSCRIPTION ENCOUNTER (OUTPATIENT)
Age: 39
End: 2019-10-21

## 2019-10-21 ENCOUNTER — APPOINTMENT (OUTPATIENT)
Dept: INTERNAL MEDICINE | Facility: CLINIC | Age: 39
End: 2019-10-21
Payer: COMMERCIAL

## 2019-10-21 VITALS
OXYGEN SATURATION: 99 % | DIASTOLIC BLOOD PRESSURE: 60 MMHG | HEIGHT: 64 IN | SYSTOLIC BLOOD PRESSURE: 100 MMHG | TEMPERATURE: 98.3 F | HEART RATE: 63 BPM | BODY MASS INDEX: 18.27 KG/M2 | WEIGHT: 107 LBS

## 2019-10-21 DIAGNOSIS — Z86.69 PERSONAL HISTORY OF OTHER DISEASES OF THE NERVOUS SYSTEM AND SENSE ORGANS: ICD-10-CM

## 2019-10-21 PROCEDURE — 99214 OFFICE O/P EST MOD 30 MIN: CPT

## 2019-10-21 NOTE — HISTORY OF PRESENT ILLNESS
[FreeTextEntry8] : KLAUS GARRISON is a 37 yo woman here for redness of right medial eye for the past 2 weeks.  The patient denies visual disturbances, fever, discharge.  Feels well overalll\par \par Had botox for the first time injected in forehead.  \par \par Feeling well.  Not worsening

## 2019-10-21 NOTE — PHYSICAL EXAM
[EOMI] : extraocular movements intact [PERRL] : pupils equal round and reactive to light [No Acute Distress] : no acute distress [Normal Outer Ear/Nose] : the outer ears and nose were normal in appearance [Normal Oropharynx] : the oropharynx was normal [No JVD] : no jugular venous distention [No Respiratory Distress] : no respiratory distress  [Clear to Auscultation] : lungs were clear to auscultation bilaterally [Normal Rate] : normal rate  [No Murmur] : no murmur heard [Normal S1, S2] : normal S1 and S2 [Regular Rhythm] : with a regular rhythm [Normal Anterior Cervical Nodes] : no anterior cervical lymphadenopathy [Normal Supraclavicular Nodes] : no supraclavicular lymphadenopathy [Normal Posterior Cervical Nodes] : no posterior cervical lymphadenopathy [No Rash] : no rash [Normal Gait] : normal gait [de-identified] : Mild redness medial right conjunctiva [de-identified] : Slender [Alert and Oriented x3] : oriented to person, place, and time

## 2019-10-21 NOTE — REVIEW OF SYSTEMS
[Vision Problems] : no vision problems [Redness] : redness [Fever] : no fever [Shortness Of Breath] : no shortness of breath [Earache] : no earache [Chest Pain] : no chest pain [Skin Rash] : no skin rash

## 2019-11-21 ENCOUNTER — APPOINTMENT (OUTPATIENT)
Dept: OBGYN | Facility: CLINIC | Age: 39
End: 2019-11-21
Payer: COMMERCIAL

## 2019-11-21 PROCEDURE — 99395 PREV VISIT EST AGE 18-39: CPT

## 2019-12-05 ENCOUNTER — APPOINTMENT (OUTPATIENT)
Dept: OBGYN | Facility: CLINIC | Age: 39
End: 2019-12-05
Payer: COMMERCIAL

## 2019-12-05 PROCEDURE — 99212 OFFICE O/P EST SF 10 MIN: CPT

## 2019-12-11 ENCOUNTER — APPOINTMENT (OUTPATIENT)
Dept: MRI IMAGING | Facility: IMAGING CENTER | Age: 39
End: 2019-12-11
Payer: COMMERCIAL

## 2019-12-11 ENCOUNTER — OUTPATIENT (OUTPATIENT)
Dept: OUTPATIENT SERVICES | Facility: HOSPITAL | Age: 39
LOS: 1 days | End: 2019-12-11
Payer: COMMERCIAL

## 2019-12-11 ENCOUNTER — APPOINTMENT (OUTPATIENT)
Dept: MAMMOGRAPHY | Facility: IMAGING CENTER | Age: 39
End: 2019-12-11
Payer: COMMERCIAL

## 2019-12-11 DIAGNOSIS — Z00.8 ENCOUNTER FOR OTHER GENERAL EXAMINATION: ICD-10-CM

## 2019-12-11 PROCEDURE — 77067 SCR MAMMO BI INCL CAD: CPT | Mod: 26

## 2019-12-11 PROCEDURE — A9585: CPT

## 2019-12-11 PROCEDURE — 70553 MRI BRAIN STEM W/O & W/DYE: CPT | Mod: 26

## 2019-12-11 PROCEDURE — 70553 MRI BRAIN STEM W/O & W/DYE: CPT

## 2019-12-11 PROCEDURE — 77063 BREAST TOMOSYNTHESIS BI: CPT

## 2019-12-11 PROCEDURE — 77063 BREAST TOMOSYNTHESIS BI: CPT | Mod: 26

## 2019-12-11 PROCEDURE — 77067 SCR MAMMO BI INCL CAD: CPT

## 2019-12-19 ENCOUNTER — APPOINTMENT (OUTPATIENT)
Dept: OBGYN | Facility: CLINIC | Age: 39
End: 2019-12-19
Payer: COMMERCIAL

## 2019-12-19 PROCEDURE — 57461 CONZ OF CERVIX W/SCOPE LEEP: CPT

## 2020-01-25 NOTE — HISTORY OF PRESENT ILLNESS
[FreeTextEntry8] : Comes in for acute medical visit.\par Had nail tips put on in July 2018.\par Today went to nail salon to have broken tip repaired.\par Tip removed and found to have greenish discoloration under her own nail.\par Had all tips removed and found to have same discoloration under 6 nails in total.\par No actual discharge. No pain, heat, redness, fever. Fall Risk

## 2020-01-30 ENCOUNTER — APPOINTMENT (OUTPATIENT)
Dept: OBGYN | Facility: CLINIC | Age: 40
End: 2020-01-30
Payer: COMMERCIAL

## 2020-01-30 PROCEDURE — 57454 BX/CURETT OF CERVIX W/SCOPE: CPT

## 2020-03-02 NOTE — PATIENT PROFILE OB - BRADEN SCORE (IF 18 OR LESS ACTIVATE SKIN INJURY RISK INCREASED GUIDELINE), MLM
Detail Level: Detailed Add 26732 Cpt? (Important Note: In 2017 The Use Of 19495 Is Being Tracked By Cms To Determine Future Global Period Reimbursement For Global Periods): no Add 17469 Cpt? (Important Note: In 2017 The Use Of 93219 Is Being Tracked By Cms To Determine Future Global Period Reimbursement For Global Periods): yes 23

## 2020-06-15 ENCOUNTER — APPOINTMENT (OUTPATIENT)
Dept: INTERNAL MEDICINE | Facility: CLINIC | Age: 40
End: 2020-06-15

## 2020-10-01 ENCOUNTER — APPOINTMENT (OUTPATIENT)
Dept: MRI IMAGING | Facility: CLINIC | Age: 40
End: 2020-10-01
Payer: COMMERCIAL

## 2020-10-01 ENCOUNTER — OUTPATIENT (OUTPATIENT)
Dept: OUTPATIENT SERVICES | Facility: HOSPITAL | Age: 40
LOS: 1 days | End: 2020-10-01
Payer: COMMERCIAL

## 2020-10-01 DIAGNOSIS — Z00.8 ENCOUNTER FOR OTHER GENERAL EXAMINATION: ICD-10-CM

## 2020-10-01 PROCEDURE — 77049 MRI BREAST C-+ W/CAD BI: CPT | Mod: 26

## 2020-10-01 PROCEDURE — C8908: CPT

## 2020-10-01 PROCEDURE — C8937: CPT

## 2020-10-01 PROCEDURE — A9585: CPT

## 2020-11-16 ENCOUNTER — APPOINTMENT (OUTPATIENT)
Dept: INTERNAL MEDICINE | Facility: CLINIC | Age: 40
End: 2020-11-16
Payer: COMMERCIAL

## 2020-11-16 VITALS
HEART RATE: 71 BPM | TEMPERATURE: 98.5 F | OXYGEN SATURATION: 100 % | SYSTOLIC BLOOD PRESSURE: 108 MMHG | DIASTOLIC BLOOD PRESSURE: 70 MMHG

## 2020-11-16 DIAGNOSIS — R39.9 UNSPECIFIED SYMPTOMS AND SIGNS INVOLVING THE GENITOURINARY SYSTEM: ICD-10-CM

## 2020-11-16 LAB
APPEARANCE: CLEAR
BACTERIA: ABNORMAL
BILIRUB UR QL STRIP: NORMAL
BILIRUBIN URINE: NEGATIVE
BLOOD URINE: ABNORMAL
COLOR: YELLOW
GLUCOSE QUALITATIVE U: NEGATIVE
GLUCOSE UR-MCNC: NORMAL
HCG UR QL: 0.2 EU/DL
HCG UR QL: NEGATIVE
HGB UR QL STRIP.AUTO: NORMAL
HYALINE CASTS: 2 /LPF
KETONES UR-MCNC: NORMAL
KETONES URINE: NEGATIVE
LEUKOCYTE ESTERASE UR QL STRIP: NORMAL
LEUKOCYTE ESTERASE URINE: ABNORMAL
MICROSCOPIC-UA: NORMAL
NITRITE UR QL STRIP: NORMAL
NITRITE URINE: NEGATIVE
PH UR STRIP: 6
PH URINE: 6
PROT UR STRIP-MCNC: NORMAL
PROTEIN URINE: NEGATIVE
QUALITY CONTROL: YES
RED BLOOD CELLS URINE: 8 /HPF
SP GR UR STRIP: 1.01
SPECIFIC GRAVITY URINE: 1.01
SQUAMOUS EPITHELIAL CELLS: 2 /HPF
UROBILINOGEN URINE: NORMAL
WHITE BLOOD CELLS URINE: 20 /HPF

## 2020-11-16 PROCEDURE — 81003 URINALYSIS AUTO W/O SCOPE: CPT | Mod: QW

## 2020-11-16 PROCEDURE — 99072 ADDL SUPL MATRL&STAF TM PHE: CPT

## 2020-11-16 PROCEDURE — 99214 OFFICE O/P EST MOD 30 MIN: CPT | Mod: 25

## 2020-11-16 PROCEDURE — 81025 URINE PREGNANCY TEST: CPT

## 2020-11-16 NOTE — ASSESSMENT
[FreeTextEntry1] : Patient with recent obgyn visit, with recent pap smear and breast MRI. Asked to fax over reports to include them in the EMR.

## 2020-11-16 NOTE — REVIEW OF SYSTEMS
[Fever] : no fever [Chills] : no chills [Night Sweats] : no night sweats [Abdominal Pain] : no abdominal pain [Nausea] : no nausea [Vomiting] : no vomiting [Heartburn] : no heartburn [Dysuria] : dysuria [Hematuria] : no hematuria [Frequency] : frequency [Negative] : Genitourinary

## 2020-11-16 NOTE — PHYSICAL EXAM
[Soft] : abdomen soft [Non Tender] : non-tender [No CVA Tenderness] : no CVA  tenderness [No Spinal Tenderness] : no spinal tenderness [de-identified] : Suprapubic tenderness Darren Newby

## 2020-11-16 NOTE — HISTORY OF PRESENT ILLNESS
[FreeTextEntry8] : Ms. KLAUS GARRISON is a 39 year old woman with recent tear duct infection finished clindamycin course who presents for an acute visit. She endorses for the past 2 days she has tingling pain while urinating and bladder pressure. She feels like its her usual UTI symptoms. She finished her period about 5-6 days ago. She denied any vaginal discharge or any recent yeast infection. She wants probiotics recommendations. \par \par

## 2020-11-18 LAB — BACTERIA UR CULT: ABNORMAL

## 2020-11-23 DIAGNOSIS — B37.9 CANDIDIASIS, UNSPECIFIED: ICD-10-CM

## 2020-12-21 PROBLEM — Z87.09 HISTORY OF SORE THROAT: Status: RESOLVED | Noted: 2018-03-27 | Resolved: 2020-12-21

## 2020-12-21 PROBLEM — Z86.69 HISTORY OF CONJUNCTIVITIS: Status: RESOLVED | Noted: 2019-10-21 | Resolved: 2020-12-21

## 2021-03-10 ENCOUNTER — APPOINTMENT (OUTPATIENT)
Dept: OBGYN | Facility: CLINIC | Age: 41
End: 2021-03-10
Payer: COMMERCIAL

## 2021-03-10 PROCEDURE — 99396 PREV VISIT EST AGE 40-64: CPT

## 2021-03-10 PROCEDURE — 99072 ADDL SUPL MATRL&STAF TM PHE: CPT

## 2021-10-15 ENCOUNTER — NON-APPOINTMENT (OUTPATIENT)
Age: 41
End: 2021-10-15

## 2021-10-20 ENCOUNTER — APPOINTMENT (OUTPATIENT)
Dept: INTERNAL MEDICINE | Facility: CLINIC | Age: 41
End: 2021-10-20
Payer: COMMERCIAL

## 2021-10-20 ENCOUNTER — NON-APPOINTMENT (OUTPATIENT)
Age: 41
End: 2021-10-20

## 2021-10-20 VITALS
TEMPERATURE: 98.1 F | BODY MASS INDEX: 17.75 KG/M2 | HEIGHT: 64 IN | WEIGHT: 104 LBS | OXYGEN SATURATION: 98 % | DIASTOLIC BLOOD PRESSURE: 60 MMHG | SYSTOLIC BLOOD PRESSURE: 100 MMHG | HEART RATE: 98 BPM

## 2021-10-20 PROCEDURE — 99214 OFFICE O/P EST MOD 30 MIN: CPT | Mod: 25

## 2021-10-20 PROCEDURE — 93000 ELECTROCARDIOGRAM COMPLETE: CPT

## 2021-10-20 RX ORDER — AMOXICILLIN AND CLAVULANATE POTASSIUM 875; 125 MG/1; MG/1
875-125 TABLET, COATED ORAL
Qty: 10 | Refills: 0 | Status: DISCONTINUED | COMMUNITY
Start: 2020-11-23 | End: 2021-10-20

## 2021-10-20 RX ORDER — FLUCONAZOLE 200 MG/1
200 TABLET ORAL
Qty: 1 | Refills: 0 | Status: DISCONTINUED | COMMUNITY
Start: 2020-11-23 | End: 2021-10-20

## 2021-10-20 RX ORDER — MOXIFLOXACIN OPHTHALMIC 5 MG/ML
0.5 SOLUTION/ DROPS OPHTHALMIC 3 TIMES DAILY
Qty: 1 | Refills: 0 | Status: DISCONTINUED | COMMUNITY
Start: 2019-10-21 | End: 2021-10-20

## 2021-10-20 RX ORDER — CHOLECALCIFEROL (VITAMIN D3) 25 MCG
27-0.8-228 TABLET,CHEWABLE ORAL
Qty: 30 | Refills: 0 | Status: DISCONTINUED | COMMUNITY
Start: 2018-03-13 | End: 2021-10-20

## 2021-10-20 RX ORDER — TERBINAFINE HYDROCHLORIDE 1 G/100G
1 CREAM TOPICAL 3 TIMES DAILY
Qty: 1 | Refills: 0 | Status: DISCONTINUED | COMMUNITY
Start: 2018-08-18 | End: 2021-10-20

## 2021-10-20 RX ORDER — L. ACIDOPHILUS/L.BULGARICUS 1MM CELL
TABLET ORAL
Qty: 30 | Refills: 0 | Status: DISCONTINUED | COMMUNITY
Start: 2020-11-16 | End: 2021-10-20

## 2021-10-20 RX ORDER — NITROFURANTOIN (MONOHYDRATE/MACROCRYSTALS) 25; 75 MG/1; MG/1
100 CAPSULE ORAL
Qty: 10 | Refills: 0 | Status: DISCONTINUED | COMMUNITY
Start: 2020-11-16 | End: 2021-10-20

## 2021-10-20 RX ORDER — OFLOXACIN 3 MG/ML
0.3 SOLUTION/ DROPS OPHTHALMIC 4 TIMES DAILY
Qty: 1 | Refills: 0 | Status: DISCONTINUED | COMMUNITY
Start: 2019-10-23 | End: 2021-10-20

## 2021-10-20 NOTE — PHYSICAL EXAM
[No Acute Distress] : no acute distress [Well Nourished] : well nourished [Well Developed] : well developed [Well-Appearing] : well-appearing [Normal Voice/Communication] : normal voice/communication [Normal Sclera/Conjunctiva] : normal sclera/conjunctiva [PERRL] : pupils equal round and reactive to light [EOMI] : extraocular movements intact [Normal Outer Ear/Nose] : the outer ears and nose were normal in appearance [No JVD] : no jugular venous distention [Supple] : supple [No Respiratory Distress] : no respiratory distress  [No Accessory Muscle Use] : no accessory muscle use [Clear to Auscultation] : lungs were clear to auscultation bilaterally [Normal Rate] : normal rate  [Regular Rhythm] : with a regular rhythm [Normal S1, S2] : normal S1 and S2 [No Carotid Bruits] : no carotid bruits [No Edema] : there was no peripheral edema [No Extremity Clubbing/Cyanosis] : no extremity clubbing/cyanosis [Soft] : abdomen soft [Non-distended] : non-distended [Normal Bowel Sounds] : normal bowel sounds [No CVA Tenderness] : no CVA  tenderness [No Spinal Tenderness] : no spinal tenderness [No Rash] : no rash [Coordination Grossly Intact] : coordination grossly intact [No Focal Deficits] : no focal deficits [Normal Gait] : normal gait [Normal Affect] : the affect was normal [Normal TMs] : both tympanic membranes were normal [Declined Breast Exam] : declined breast exam  [Declined Rectal Exam] : declined rectal exam [Speech Grossly Normal] : speech grossly normal [Alert and Oriented x3] : oriented to person, place, and time [de-identified] : Wearing full facemask; no sinus tenderness [de-identified] : No stridor [de-identified] : R = 16; good air entry; no wheezing [de-identified] : Normal bilateral radial pulses; no cords [de-identified] : Per GYN

## 2021-10-20 NOTE — HISTORY OF PRESENT ILLNESS
[FreeTextEntry8] : Tested positive for covid 9/24/21.\par Got MAB infusion. Recovered quickly within 24 hours.\par No myalgias.\par No calf pain.\par No edema, orthopnea or PND.\par Denies abdominal pain or n/v/d.\par Denies chest pain.\par Denies cough.\par Denies f/c/s.\par Denies SOB at rest.\par Gets HO especially when exercising.\par Reduced exercise tolerance.\par Denies wheezing.\par Superior heart racing overnight x 1 and with exercise x 2.\par Occasional h/a.\par Mild weakness.\par No loss of taste or smell.\par Trying to control anxiety.

## 2021-10-20 NOTE — REVIEW OF SYSTEMS
[Fatigue] : fatigue [Hearing Loss] : hearing loss [Palpitations] : palpitations [Dyspnea on Exertion] : dyspnea on exertion [Headache] : headache [Anxiety] : anxiety [Negative] : Heme/Lymph

## 2021-10-20 NOTE — HEALTH RISK ASSESSMENT
[Intercurrent ED visits] : went to ED [Intercurrent Urgi Care visits] : went to urgent care [Yes] : Yes [No] : In the past 12 months have you used drugs other than those required for medical reasons? No [No falls in past year] : Patient reported no falls in the past year [0] : 2) Feeling down, depressed, or hopeless: Not at all (0) [PHQ-2 Negative - No further assessment needed] : PHQ-2 Negative - No further assessment needed [] : No [de-identified] : breast surgery [de-identified] : exercises daily [de-identified] : regular [[id number]] : 0

## 2021-10-20 NOTE — ASSESSMENT
[FreeTextEntry1] : Recent COVID-19 infection.  Presents with intermittent palpitations particularly with exercise.  Also notices reduced exercise tolerance and dyspnea on exertion particularly with exercise. \par Full labs below sent\par See scanned EKG = report reviewed with patient\par To see cardiology for echo, Holter to evaluate for arrhythmia\par To do chest x-ray\par May need CT chest\par Full PFTs\par All vaccines declined\par She declined any inhalers, steroids\par Wishes to do deep breathing and home diffuser with her essential oils\par She will arrange for a full physical\par She will return in follow-up as needed\par She will call if her status worsens or does not improve\par She will call for any medical issues\par All of the above was discussed in detail with her and all of her questions were answered\par She verbally confirmed understanding of all of the above and agreement with the above plan\par

## 2021-10-22 LAB
25(OH)D3 SERPL-MCNC: 44.8 NG/ML
ABO + RH PNL BLD: NORMAL
ALBUMIN SERPL ELPH-MCNC: 4.8 G/DL
ALP BLD-CCNC: 40 U/L
ALT SERPL-CCNC: 10 U/L
ANION GAP SERPL CALC-SCNC: 14 MMOL/L
APPEARANCE: ABNORMAL
AST SERPL-CCNC: 14 U/L
BACTERIA: NEGATIVE
BASOPHILS # BLD AUTO: 0.02 K/UL
BASOPHILS NFR BLD AUTO: 0.4 %
BILIRUB SERPL-MCNC: 0.4 MG/DL
BILIRUBIN URINE: NEGATIVE
BLOOD URINE: ABNORMAL
BUN SERPL-MCNC: 18 MG/DL
CALCIUM SERPL-MCNC: 9.9 MG/DL
CHLORIDE SERPL-SCNC: 103 MMOL/L
CHOLEST SERPL-MCNC: 212 MG/DL
CO2 SERPL-SCNC: 24 MMOL/L
COLOR: YELLOW
COVID-19 NUCLEOCAPSID  GAM ANTIBODY INTERPRETATION: POSITIVE
COVID-19 SPIKE DOMAIN ANTIBODY INTERPRETATION: POSITIVE
CREAT SERPL-MCNC: 0.86 MG/DL
DEPRECATED D DIMER PPP IA-ACNC: <150 NG/ML DDU
EOSINOPHIL # BLD AUTO: 0.05 K/UL
EOSINOPHIL NFR BLD AUTO: 0.9 %
ESTIMATED AVERAGE GLUCOSE: 97 MG/DL
FOLATE SERPL-MCNC: 15.3 NG/ML
GLUCOSE QUALITATIVE U: NEGATIVE
GLUCOSE SERPL-MCNC: 81 MG/DL
HBA1C MFR BLD HPLC: 5 %
HCT VFR BLD CALC: 39.4 %
HCV AB SER QL: NONREACTIVE
HCV S/CO RATIO: 0.29 S/CO
HDLC SERPL-MCNC: 84 MG/DL
HGB BLD-MCNC: 12.6 G/DL
HYALINE CASTS: 2 /LPF
IMM GRANULOCYTES NFR BLD AUTO: 0.2 %
IRON SERPL-MCNC: 101 UG/DL
KETONES URINE: NEGATIVE
LDLC SERPL CALC-MCNC: 113 MG/DL
LEUKOCYTE ESTERASE URINE: NEGATIVE
LYMPHOCYTES # BLD AUTO: 1.57 K/UL
LYMPHOCYTES NFR BLD AUTO: 29.8 %
MAGNESIUM SERPL-MCNC: 2 MG/DL
MAN DIFF?: NORMAL
MCHC RBC-ENTMCNC: 30.9 PG
MCHC RBC-ENTMCNC: 32 GM/DL
MCV RBC AUTO: 96.6 FL
MICROSCOPIC-UA: NORMAL
MONOCYTES # BLD AUTO: 0.35 K/UL
MONOCYTES NFR BLD AUTO: 6.6 %
NEUTROPHILS # BLD AUTO: 3.27 K/UL
NEUTROPHILS NFR BLD AUTO: 62.1 %
NITRITE URINE: NEGATIVE
NONHDLC SERPL-MCNC: 128 MG/DL
NT-PROBNP SERPL-MCNC: 14 PG/ML
PH URINE: 6
PHOSPHATE SERPL-MCNC: 3.7 MG/DL
PLATELET # BLD AUTO: 282 K/UL
POTASSIUM SERPL-SCNC: 5.3 MMOL/L
PROT SERPL-MCNC: 6.9 G/DL
PROTEIN URINE: NORMAL
RBC # BLD: 4.08 M/UL
RBC # FLD: 12.5 %
RED BLOOD CELLS URINE: 10 /HPF
SARS-COV-2 AB SERPL IA-ACNC: >250 U/ML
SARS-COV-2 AB SERPL QL IA: 18.8 INDEX
SODIUM SERPL-SCNC: 142 MMOL/L
SPECIFIC GRAVITY URINE: 1.02
SQUAMOUS EPITHELIAL CELLS: 8 /HPF
TRIGL SERPL-MCNC: 73 MG/DL
TSH SERPL-ACNC: 1.04 UIU/ML
UROBILINOGEN URINE: NORMAL
VIT B12 SERPL-MCNC: 430 PG/ML
WBC # FLD AUTO: 5.27 K/UL
WHITE BLOOD CELLS URINE: 4 /HPF

## 2021-10-25 LAB — HIV1+2 AB SPEC QL IA.RAPID: NONREACTIVE

## 2021-10-28 ENCOUNTER — APPOINTMENT (OUTPATIENT)
Dept: RADIOLOGY | Facility: HOSPITAL | Age: 41
End: 2021-10-28
Payer: COMMERCIAL

## 2021-10-28 ENCOUNTER — OUTPATIENT (OUTPATIENT)
Dept: OUTPATIENT SERVICES | Facility: HOSPITAL | Age: 41
LOS: 1 days | End: 2021-10-28
Payer: COMMERCIAL

## 2021-10-28 DIAGNOSIS — R06.00 DYSPNEA, UNSPECIFIED: ICD-10-CM

## 2021-10-28 PROCEDURE — 71046 X-RAY EXAM CHEST 2 VIEWS: CPT

## 2021-10-28 PROCEDURE — 71046 X-RAY EXAM CHEST 2 VIEWS: CPT | Mod: 26

## 2021-12-13 ENCOUNTER — APPOINTMENT (OUTPATIENT)
Dept: OBGYN | Facility: CLINIC | Age: 41
End: 2021-12-13
Payer: COMMERCIAL

## 2021-12-13 VITALS — BODY MASS INDEX: 19.74 KG/M2 | DIASTOLIC BLOOD PRESSURE: 85 MMHG | SYSTOLIC BLOOD PRESSURE: 130 MMHG | WEIGHT: 115 LBS

## 2021-12-13 PROCEDURE — 99212 OFFICE O/P EST SF 10 MIN: CPT

## 2021-12-13 PROCEDURE — 36415 COLL VENOUS BLD VENIPUNCTURE: CPT

## 2021-12-13 NOTE — HISTORY OF PRESENT ILLNESS
[FreeTextEntry1] : Amenorrhoea\par LMP 11/11/21, had initial positive pregnancy test and then two negative tests\par Has no other problems\par HAd Covid in September took antibody treatment

## 2021-12-13 NOTE — PHYSICAL EXAM
[Appropriately responsive] : appropriately responsive [Alert] : alert [No Acute Distress] : no acute distress [No Lymphadenopathy] : no lymphadenopathy [Regular Rate Rhythm] : regular rate rhythm [No Murmurs] : no murmurs [Clear to Auscultation B/L] : clear to auscultation bilaterally [Soft] : soft [Non-tender] : non-tender [Non-distended] : non-distended [No HSM] : No HSM [No Lesions] : no lesions [No Mass] : no mass [Oriented x3] : oriented x3 [Examination Of The Breasts] : a normal appearance [] : an implant [No Masses] : no breast masses were palpable [Labia Majora] : normal [Labia Minora] : normal [Normal] : normal [Uterine Adnexae] : normal

## 2021-12-14 LAB — HCG SERPL-MCNC: <1 MIU/ML

## 2021-12-15 ENCOUNTER — NON-APPOINTMENT (OUTPATIENT)
Age: 41
End: 2021-12-15

## 2021-12-15 ENCOUNTER — APPOINTMENT (OUTPATIENT)
Dept: CARDIOLOGY | Facility: CLINIC | Age: 41
End: 2021-12-15
Payer: COMMERCIAL

## 2021-12-15 VITALS
SYSTOLIC BLOOD PRESSURE: 108 MMHG | DIASTOLIC BLOOD PRESSURE: 68 MMHG | RESPIRATION RATE: 16 BRPM | HEIGHT: 71 IN | OXYGEN SATURATION: 98 % | TEMPERATURE: 98.4 F | HEART RATE: 79 BPM | WEIGHT: 108 LBS | BODY MASS INDEX: 15.12 KG/M2

## 2021-12-15 VITALS
OXYGEN SATURATION: 94 % | BODY MASS INDEX: 41.02 KG/M2 | HEART RATE: 85 BPM | RESPIRATION RATE: 16 BRPM | WEIGHT: 293 LBS | HEIGHT: 71 IN | TEMPERATURE: 97.7 F

## 2021-12-15 DIAGNOSIS — R00.2 PALPITATIONS: ICD-10-CM

## 2021-12-15 PROCEDURE — 99203 OFFICE O/P NEW LOW 30 MIN: CPT

## 2021-12-15 PROCEDURE — 93000 ELECTROCARDIOGRAM COMPLETE: CPT

## 2021-12-15 RX ORDER — MAGNESIUM OXIDE/MAG AA CHELATE 300 MG
CAPSULE ORAL
Refills: 0 | Status: DISCONTINUED | COMMUNITY
End: 2021-12-15

## 2021-12-15 RX ORDER — ASPIRIN 81 MG
81 TABLET, DELAYED RELEASE (ENTERIC COATED) ORAL
Refills: 0 | Status: DISCONTINUED | COMMUNITY
End: 2021-12-15

## 2021-12-15 RX ORDER — ASCORBIC ACID 500 MG
TABLET ORAL
Refills: 0 | Status: DISCONTINUED | COMMUNITY
End: 2021-12-15

## 2021-12-15 RX ORDER — COLD-HOT PACK
EACH MISCELLANEOUS
Refills: 0 | Status: DISCONTINUED | COMMUNITY
End: 2021-12-15

## 2021-12-15 NOTE — REASON FOR VISIT
[CV Risk Factors and Non-Cardiac Disease] : CV risk factors and non-cardiac disease [FreeTextEntry3] : Dr. Heart [FreeTextEntry1] : 41F female with COVID 19 (9/2021) presents for cardiac evaluation. PMD Dr. Lorna Heart.\par \par Patient tested postive for covid on 9/24/21 (unvaccinated) had antibody rx. Not hospitalized.\par \par Has had fatigue, dyspnea, decline in exercise tolerance since the infection. improved now near baseline\par Had palpitations for 1 month after covid infection but in the last few weeks resolved\par had dyspnea on exertion as well which is improving\par Had an episode of presyncope after first time going back to to gym in mid October. \par no syncope\par \par very active, swimming multiple times a week and working out on top. \par \par \par PMHx:\par Anxiety\par Healthy\par \par Social hx: Never smoked, alcohol within limits\par \par Family Hx: no premature ASCVD. Both parents with HTN. \par

## 2021-12-15 NOTE — PHYSICAL EXAM
[Well Developed] : well developed [Well Nourished] : well nourished [No Acute Distress] : no acute distress [Normal Conjunctiva] : normal conjunctiva [Normal Venous Pressure] : normal venous pressure [No Carotid Bruit] : no carotid bruit [Normal S1, S2] : normal S1, S2 [No Rub] : no rub [No Gallop] : no gallop [5th Left ICS - MCL] : palpated at the 5th LICS in the midclavicular line [Normal] : normal [Normal Rate] : normal [Rhythm Regular] : regular [Normal S1] : normal S1 [Normal S2] : normal S2 [I] : a grade 1 [2+] : left 2+ [No Abnormalities] : the abdominal aorta was not enlarged and no bruit was heard [Clear Lung Fields] : clear lung fields [Good Air Entry] : good air entry [No Respiratory Distress] : no respiratory distress  [Soft] : abdomen soft [Non Tender] : non-tender [No Masses/organomegaly] : no masses/organomegaly [Normal Bowel Sounds] : normal bowel sounds [Normal Gait] : normal gait [No Edema] : no edema [No Cyanosis] : no cyanosis [No Clubbing] : no clubbing [No Varicosities] : no varicosities [No Rash] : no rash [No Skin Lesions] : no skin lesions [Moves all extremities] : moves all extremities [No Focal Deficits] : no focal deficits [Normal Speech] : normal speech [Alert and Oriented] : alert and oriented [Normal memory] : normal memory [Apical Thrill] : no thrill palpable at the apex [Bruit] : no bruit heard

## 2021-12-15 NOTE — DISCUSSION/SUMMARY
[FreeTextEntry1] : This is a 41-year-old female with past medical history significant for COVID-19 infection  September 24th, 2021, who comes in for cardiac consultation. She denies chest pain, shortness of breath, dizziness or syncope.\par After her COVID-19 infection, she received intravenous antibody therapy, did not require hospitalization but had increasing complaints of fatigue, reduced exercise tolerance shortness of breath, and palpitations.  She had a presyncopal event when starting back at the gym which she felt was due to reduced aerobic capacity, and limited hydration.  Over the next month these symptoms dissipated.  She is now feeling well.\par She does not drink excessive caffeine or alcohol.  She has no history of rheumatic fever.\par She has no known cardiac risk factors.  Both of her parents had hypertension.  She exercises on a regular basis including swimming.\par Electrocardiogram done December 15, 2021 demonstrate normal sinus rhythm rate 77 bpm is otherwise unremarkable.\par Lipid panel done October 20, 2021 demonstrated cholesterol 212, HDL of 84, LDL of 113, non-HDL cholesterol 128 mg/dL and triglycerides 73 mg/dL.\par The patient exercises on a regular basis and it took her over a month to get her exercise capacity back.  She is concerned about post Covid effects on the heart, and at one point was told she had a heart murmur.\par She will schedule an echo Doppler examination to evaluate her left ventricular function, chamber size, murmur, rule out mitral valve prolapse, and rule out hypertrophy.\par She is encouraged to maintain good hydration.  She will follow-up with me after above-noted diagnostic tests are completed.\par The patient understands that aerobic exercises must be increased to 40 minutes 4 times per week. A detailed discussion of lifestyle modification was done today. The patient has a good understanding of the diagnosis, and treatment plan. Lifestyle modification was also outlined.\par

## 2021-12-21 ENCOUNTER — LABORATORY RESULT (OUTPATIENT)
Age: 41
End: 2021-12-21

## 2022-01-01 NOTE — PATIENT PROFILE OB - ATTEMPT TO OOB
"This note was copied from the mother's chart.     07/07/22 1330   Maternal Assessment   Breast Shape Bilateral:;round   Breast Density Bilateral:;soft   Areola Bilateral:;elastic   Nipples Bilateral:;everted   Right Nipple Symptoms tender   Maternal Infant Feeding   Maternal Emotional State assist needed   Infant Positioning clutch/football   Signs of Milk Transfer audible swallow;infant jaw motion present   Pain with Feeding yes   Pain Location nipples, bilateral   Comfort Measures Before/During Feeding infant position adjusted;latch adjusted;maternal position adjusted;suction broken using finger   Comfort Measures Following Feeding expressed milk applied   Nipple Shape After Feeding, Left mild midline compression   Latch Assistance yes   Breast Pumping   Breast Pumping hand expression utilized   Assisted mother with nursing session. Baby had difficulty latching deeply. Suck was uncoordinated. Sucking exercises performed on gloved finger and baby became more rhythmic. Positioned back to L breast skin to skin in football. Assisted with latch. Baby had difficulty achieving a wide gape. Pain score at "2-3" per patient. Baby was more coordinated and rhythmic with breast compression. Intermittent audible swallows present. Assisted with positioning to R breast in football and to achieve latch. Baby latched to R breast with wider gape and continued to nurse more rhythmically on this breast. Reviewed breastfeeding basics.  number written on the board.   "
Mom and fob have baby latch to breast and sucking effectively. Reviewed breastfeeding baby education. Recommended pt to pump after feedings for 15 minutes. Mom will use her own personal breastpump.mom then encouraged to feed baby ebm by spoon. Recommended mom to use [employer] breastpump if phototherapy continues and baby is unable to be discharged.lc number on whiteboard if mom needs any assistance  
no

## 2022-01-04 ENCOUNTER — APPOINTMENT (OUTPATIENT)
Dept: INTERNAL MEDICINE | Facility: CLINIC | Age: 42
End: 2022-01-04
Payer: COMMERCIAL

## 2022-01-04 ENCOUNTER — RESULT REVIEW (OUTPATIENT)
Age: 42
End: 2022-01-04

## 2022-01-04 VITALS
HEART RATE: 71 BPM | WEIGHT: 106 LBS | SYSTOLIC BLOOD PRESSURE: 90 MMHG | DIASTOLIC BLOOD PRESSURE: 60 MMHG | TEMPERATURE: 97.7 F | BODY MASS INDEX: 18.55 KG/M2 | OXYGEN SATURATION: 99 % | HEIGHT: 63.5 IN

## 2022-01-04 DIAGNOSIS — Z78.9 OTHER SPECIFIED HEALTH STATUS: ICD-10-CM

## 2022-01-04 DIAGNOSIS — Z87.891 PERSONAL HISTORY OF NICOTINE DEPENDENCE: ICD-10-CM

## 2022-01-04 DIAGNOSIS — Z23 ENCOUNTER FOR IMMUNIZATION: ICD-10-CM

## 2022-01-04 DIAGNOSIS — Z12.39 ENCOUNTER FOR OTHER SCREENING FOR MALIGNANT NEOPLASM OF BREAST: ICD-10-CM

## 2022-01-04 DIAGNOSIS — F19.90 OTHER PSYCHOACTIVE SUBSTANCE USE, UNSPECIFIED, UNCOMPLICATED: ICD-10-CM

## 2022-01-04 PROCEDURE — 99396 PREV VISIT EST AGE 40-64: CPT | Mod: 25

## 2022-01-04 PROCEDURE — 36415 COLL VENOUS BLD VENIPUNCTURE: CPT

## 2022-01-04 NOTE — ASSESSMENT
[FreeTextEntry1] : See health maintenance assessment and plan outlined below.\par In addition:\par Urine testing and labs sent today = will contact patient with results\par Wishes to see podiatry/Ortho = foot regarding bunions = multiple referrals given\par She declined orthopedic evaluation/physical therapy for her neck and back pain\par She will apply heat; continue massage///consider trial of topical diclofenac gel and/or topical lidocaine patch; she declines any oral therapy\par GI follow-up in 2022 for repeat colonoscopy \par Saw GYN 2021 = due for follow-up in March 2022\par To do mammograms as soon as possible 2022 = prescription given\par Continue diet and exercise as outlined\par She will discuss her lipoprotein a level elevation with cardiology and follow through with Dr. Salazar 2022\par She recently had chest imaging\par She declined PFTs\par Vaccines reviewed\par ENT follow-up 2022\par Dermatology evaluation 2022 for full skin check\par Ophthalmology follow-up 2022\par Dental follow-up 2022\par She will return in follow-up for her annual physical and as needed\par She will call for any medical issues\par All of the above was discussed in detail with her and all of her questions were answered and written directions were given\par She verbally confirmed understanding of all of the above and agreement with the above plan

## 2022-01-04 NOTE — HISTORY OF PRESENT ILLNESS
[FreeTextEntry1] : Comes in for annual physical. [de-identified] : Feeling well.\par Recovered from covid.

## 2022-01-04 NOTE — PAST MEDICAL HISTORY
[Perimenopausal] : perimenopausal [Menarche Age ____] : age at menarche was [unfilled] [Definite ___ (Date)] : the last menstrual period was [unfilled] [Irregular Cycle Intervals] : are  irregular [Total Preg ___] : G[unfilled] [Live Births ___] : P[unfilled]  [Premature ___] : Premature: [unfilled] [Living ___] : Living: [unfilled] [AB Spont ___] : miscarriages: [unfilled]

## 2022-01-04 NOTE — HEALTH RISK ASSESSMENT
[Very Good] : ~his/her~ current health as very good [Excellent] : ~his/her~  mood as  excellent [Intercurrent ED visits] : went to ED [Intercurrent Urgi Care visits] : went to urgent care [Never] : Never [Yes] : Yes [4 or more  times a week (4 pts)] : 4 or more  times a week (4 points) [1 or 2 (0 pts)] : 1 or 2 (0 points) [Never (0 pts)] : Never (0 points) [No] : In the past 12 months have you used drugs other than those required for medical reasons? No [Any fall with injury in past year] : Patient reported fall with injury in the past year [0] : 2) Feeling down, depressed, or hopeless: Not at all (0) [PHQ-2 Negative - No further assessment needed] : PHQ-2 Negative - No further assessment needed [Patient declined mammogram] : Patient declined mammogram [HIV test declined] : HIV test declined [Hepatitis C test declined] : Hepatitis C test declined [None] : None [With Family] : lives with family [# of Members in Household ___] :  household currently consist of [unfilled] member(s) [Unemployed] : unemployed [Graduate School] : graduate school [] :  [# Of Children ___] : has [unfilled] children [Feels Safe at Home] : Feels safe at home [Fully functional (bathing, dressing, toileting, transferring, walking, feeding)] : Fully functional (bathing, dressing, toileting, transferring, walking, feeding) [Fully functional (using the telephone, shopping, preparing meals, housekeeping, doing laundry, using] : Fully functional and needs no help or supervision to perform IADLs (using the telephone, shopping, preparing meals, housekeeping, doing laundry, using transportation, managing medications and managing finances) [Reports changes in hearing] : Reports changes in hearing [Smoke Detector] : smoke detector [Carbon Monoxide Detector] : carbon monoxide detector [Guns at Home] : guns at home [Seat Belt] :  uses seat belt [Sunscreen] : uses sunscreen [With Patient/Caregiver] : , with patient/caregiver [Designated Healthcare Proxy] : Designated healthcare proxy [Name: ___] : Health Care Proxy's Name: [unfilled]  [Relationship: ___] : Relationship: [unfilled] [FreeTextEntry1] : left sciatica, bunions b/l, b/l shoulder pain [de-identified] : cardiology, GYN, dentist, derm [de-identified] : exercises [de-identified] : regular [[id number]] : 0 [Change in mental status noted] : No change in mental status noted [Reports changes in vision] : Reports no changes in vision [Reports changes in dental health] : Reports no changes in dental health [Travel to Developing Areas] : does not  travel to developing areas [TB Exposure] : is not being exposed to tuberculosis [Caregiver Concerns] : does not have caregiver concerns [PapSmearDate] : 03/21 [de-identified] : volunteers [AdvancecareDate] : 01/21

## 2022-01-04 NOTE — PHYSICAL EXAM
[No Acute Distress] : no acute distress [Well Nourished] : well nourished [Well Developed] : well developed [Well-Appearing] : well-appearing [Normal Voice/Communication] : normal voice/communication [Normal Sclera/Conjunctiva] : normal sclera/conjunctiva [PERRL] : pupils equal round and reactive to light [EOMI] : extraocular movements intact [Normal Outer Ear/Nose] : the outer ears and nose were normal in appearance [No JVD] : no jugular venous distention [No Lymphadenopathy] : no lymphadenopathy [Supple] : supple [No Respiratory Distress] : no respiratory distress  [No Accessory Muscle Use] : no accessory muscle use [Clear to Auscultation] : lungs were clear to auscultation bilaterally [Normal Rate] : normal rate  [Regular Rhythm] : with a regular rhythm [Normal S1, S2] : normal S1 and S2 [No Murmur] : no murmur heard [No Carotid Bruits] : no carotid bruits [Pedal Pulses Present] : the pedal pulses are present [No Edema] : there was no peripheral edema [No Extremity Clubbing/Cyanosis] : no extremity clubbing/cyanosis [Soft] : abdomen soft [Non Tender] : non-tender [Non-distended] : non-distended [No HSM] : no HSM [Normal Bowel Sounds] : normal bowel sounds [Normal Posterior Cervical Nodes] : no posterior cervical lymphadenopathy [Normal Anterior Cervical Nodes] : no anterior cervical lymphadenopathy [No CVA Tenderness] : no CVA  tenderness [No Spinal Tenderness] : no spinal tenderness [Grossly Normal Strength/Tone] : grossly normal strength/tone [No Rash] : no rash [Coordination Grossly Intact] : coordination grossly intact [No Focal Deficits] : no focal deficits [Normal Gait] : normal gait [Normal Affect] : the affect was normal [Normal TMs] : both tympanic membranes were normal [No Masses] : no palpable masses [No Nipple Discharge] : no nipple discharge [No Axillary Lymphadenopathy] : no axillary lymphadenopathy [Declined Rectal Exam] : declined rectal exam [Normal Supraclavicular Nodes] : no supraclavicular lymphadenopathy [Normal Axillary Nodes] : no axillary lymphadenopathy [Speech Grossly Normal] : speech grossly normal [Alert and Oriented x3] : oriented to person, place, and time [de-identified] : No sinus tenderness; wearing full facemask [de-identified] : No stridor or TM [de-identified] : R = 16 [de-identified] : Normal bilateral radial pulses; no cords [de-identified] : Bilateral implants [de-identified] : As per gynecology

## 2022-01-07 LAB
APO LP(A) SERPL-MCNC: 198.6 NMOL/L
APPEARANCE: CLEAR
BACTERIA UR CULT: NORMAL
BACTERIA: NEGATIVE
BILIRUBIN URINE: NEGATIVE
BLOOD URINE: ABNORMAL
CHOLEST SERPL-MCNC: 229 MG/DL
COLOR: YELLOW
COVID-19 NUCLEOCAPSID  GAM ANTIBODY INTERPRETATION: POSITIVE
COVID-19 SPIKE DOMAIN ANTIBODY INTERPRETATION: POSITIVE
GLUCOSE QUALITATIVE U: NEGATIVE
HCV AB SER QL: NONREACTIVE
HCV S/CO RATIO: 0.26 S/CO
HDLC SERPL-MCNC: 84 MG/DL
HIV1+2 AB SPEC QL IA.RAPID: NONREACTIVE
HYALINE CASTS: 1 /LPF
KETONES URINE: NEGATIVE
LDLC SERPL CALC-MCNC: 131 MG/DL
LEUKOCYTE ESTERASE URINE: NEGATIVE
MICROSCOPIC-UA: NORMAL
NITRITE URINE: NEGATIVE
NONHDLC SERPL-MCNC: 145 MG/DL
PH URINE: 6
PROTEIN URINE: NORMAL
RED BLOOD CELLS URINE: 3 /HPF
SARS-COV-2 AB SERPL IA-ACNC: >250 U/ML
SARS-COV-2 AB SERPL QL IA: 225 INDEX
SPECIFIC GRAVITY URINE: 1.02
SQUAMOUS EPITHELIAL CELLS: 2 /HPF
TRIGL SERPL-MCNC: 71 MG/DL
URINE CYTOLOGY: NORMAL
UROBILINOGEN URINE: NORMAL
WHITE BLOOD CELLS URINE: 0 /HPF

## 2022-01-12 ENCOUNTER — APPOINTMENT (OUTPATIENT)
Dept: CARDIOLOGY | Facility: CLINIC | Age: 42
End: 2022-01-12
Payer: COMMERCIAL

## 2022-01-12 DIAGNOSIS — R06.00 DYSPNEA, UNSPECIFIED: ICD-10-CM

## 2022-01-12 DIAGNOSIS — R01.1 CARDIAC MURMUR, UNSPECIFIED: ICD-10-CM

## 2022-01-12 PROCEDURE — 93306 TTE W/DOPPLER COMPLETE: CPT

## 2022-01-16 ENCOUNTER — TRANSCRIPTION ENCOUNTER (OUTPATIENT)
Age: 42
End: 2022-01-16

## 2022-01-21 PROBLEM — R01.1 MURMUR: Status: ACTIVE | Noted: 2021-12-15

## 2022-01-21 PROBLEM — R06.00 DOE (DYSPNEA ON EXERTION): Status: ACTIVE | Noted: 2021-10-20

## 2022-01-28 ENCOUNTER — APPOINTMENT (OUTPATIENT)
Dept: CARDIOLOGY | Facility: CLINIC | Age: 42
End: 2022-01-28
Payer: COMMERCIAL

## 2022-01-28 VITALS
TEMPERATURE: 98.1 F | HEIGHT: 63 IN | HEART RATE: 80 BPM | SYSTOLIC BLOOD PRESSURE: 116 MMHG | DIASTOLIC BLOOD PRESSURE: 74 MMHG | RESPIRATION RATE: 16 BRPM | BODY MASS INDEX: 18.96 KG/M2 | OXYGEN SATURATION: 97 % | WEIGHT: 107 LBS

## 2022-01-28 DIAGNOSIS — E78.41 ELEVATED LIPOPROTEIN(A): ICD-10-CM

## 2022-01-28 DIAGNOSIS — U07.1 COVID-19: ICD-10-CM

## 2022-01-28 DIAGNOSIS — I34.0 NONRHEUMATIC MITRAL (VALVE) INSUFFICIENCY: ICD-10-CM

## 2022-01-28 PROCEDURE — 99213 OFFICE O/P EST LOW 20 MIN: CPT

## 2022-01-28 NOTE — DISCUSSION/SUMMARY
[FreeTextEntry1] : This is a 41-year-old female with past medical history significant for COVID-19 infection  September 24th, 2021, who comes in for cardiac follow-up evaluation.  She denies chest pain, shortness of breath, dizziness or syncope.\par She has had no further chest pain.\par Echo Doppler examination done January 12, 2022 demonstrated minimal to mild mitral valve regurgitation minimal aortic valve regurgitation, minimal tricuspid valve regurgitation, and physiologic pulmonic valve regurgitation estimated ejection fraction was 65%, with normal left ventricular function.\par Blood work done January 4, 2022 demonstrated total cholesterol 229, HDL of 84, LDL calculated 131 mg/dL, triglycerides 71 mg/dL and non-HDL cholesterol 145 mg/dL.  Her lipoprotein a was 198 nmol/L\par The patient is concerned about the elevated lipoprotein a.  She has no family history of early atherosclerotic heart disease.  She has no other cardiac risk factors.  I have explained to her that lipoprotein a is a risk enhancing feature.  She is read a lot about this and wishes to proceed with a plant-based diet.  I have asked to work with our registered dietitian to create a diet which is appropriate for her.  She does not wish to be on any type of medical therapy.\par She is currently hemodynamically stable and asymptomatic from a cardiac standpoint.\par PMH:\par After her COVID-19 infection, she received intravenous antibody therapy, did not require hospitalization but had increasing complaints of fatigue, reduced exercise tolerance shortness of breath, and palpitations.  She had a presyncopal event when starting back at the gym which she felt was due to reduced aerobic capacity, and limited hydration.  Over the next month these symptoms dissipated.  She is now feeling well\par She has no known cardiac risk factors.  Both of her parents had hypertension.  She exercises on a regular basis including swimming.\par Electrocardiogram done December 15, 2021 demonstrate normal sinus rhythm rate 77 bpm is otherwise unremarkable.\par Lipid panel done October 20, 2021 demonstrated cholesterol 212, HDL of 84, LDL of 113, non-HDL cholesterol 128 mg/dL and triglycerides 73 mg/dL.\par She is encouraged to maintain good hydration.  She will follow-up with me after above-noted diagnostic tests are completed.\par The patient understands that aerobic exercises must be increased to 40 minutes 4 times per week. A detailed discussion of lifestyle modification was done today. The patient has a good understanding of the diagnosis, and treatment plan. Lifestyle modification was also outlined.\par

## 2022-01-31 RX ORDER — OMEGA-3/DHA/EPA/FISH OIL 300-1000MG
CAPSULE ORAL
Refills: 0 | Status: ACTIVE | COMMUNITY

## 2022-01-31 RX ORDER — CHROMIUM 200 MCG
TABLET ORAL
Refills: 0 | Status: ACTIVE | COMMUNITY

## 2022-01-31 RX ORDER — COLD-HOT PACK
EACH MISCELLANEOUS
Refills: 0 | Status: ACTIVE | COMMUNITY

## 2022-03-01 NOTE — PERINATAL/NEONATAL BEREAVEMENT NOTE - NS PERI BEREAVE FOOTPRINTS ON
Acute Abdominal Pain in Children    WHAT YOU NEED TO KNOW:    The cause of your child's abdominal pain may not be found. If a cause is found, treatment will depend on what the cause is.     DISCHARGE INSTRUCTIONS:    Seek care immediately if:     Your child's bowel movement has blood in it, or looks like black tar.     Your child is bleeding from his or her rectum.     Your child cannot stop vomiting, or vomits blood.    Your child's abdomen is larger than usual, very painful, and hard.     Your child has severe pain in his or her abdomen.     Your child feels weak, dizzy, or faint.    Your child stops passing gas and having bowel movements.     Contact your child's healthcare provider if:     Your child has a fever.    Your child has new symptoms.     Your child's symptoms do not get better with treatment.     You have questions or concerns about your child's condition or care.    Medicines may be given to decrease pain, treat a bacterial infection, or manage your child's symptoms. Give your child's medicine as directed. Call your child's healthcare provider if you think the medicine is not working as expected. Tell him if your child is allergic to any medicine. Keep a current list of the medicines, vitamins, and herbs your child takes. Include the amounts, and when, how, and why they are taken. Bring the list or the medicines in their containers to follow-up visits. Carry your child's medicine list with you in case of an emergency.    Care for your child:     Apply heat on your child's abdomen for 20 to 30 minutes every 2 hours. Do this for as many days as directed. Heat helps decrease pain and muscle spasms.    Help your child manage stress. Your child's healthcare provider may recommend relaxation techniques and deep breathing exercises to help decrease your child's stress. The provider may recommend that your child talk to someone about his or her stress or anxiety, such as a school counselor.     Make changes to the foods you give to your child as directed.  Give your child more fiber if he has constipation. High-fiber foods include fruits, vegetables, whole-grain foods, and legumes.     Do not give your child foods that cause gas, such as broccoli, cabbage, and cauliflower. Do not give him soda or carbonated drinks, because these may also cause gas.     Do not give your child foods or drinks that contain sorbitol or fructose if he has diarrhea and bloating. Some examples are fruit juices, candy, jelly, and sugar-free gum. Do not give him high-fat foods, such as fried foods, cheeseburgers, hot dogs, and desserts.    Give your child small meals more often. This may help decrease his abdominal pain.     Follow up with your child's healthcare provider as directed: Write down your questions so you remember to ask them during your child's visits. You can use the Zofran as needed for vomiting. Please follow all pharmacy packaging instructions and warnings.      Acute Abdominal Pain in Children    WHAT YOU NEED TO KNOW:    The cause of your child's abdominal pain may not be found. If a cause is found, treatment will depend on what the cause is.     DISCHARGE INSTRUCTIONS:    Seek care immediately if:     Your child's bowel movement has blood in it, or looks like black tar.     Your child is bleeding from his or her rectum.     Your child cannot stop vomiting, or vomits blood.    Your child's abdomen is larger than usual, very painful, and hard.     Your child has severe pain in his or her abdomen.     Your child feels weak, dizzy, or faint.    Your child stops passing gas and having bowel movements.     Contact your child's healthcare provider if:     Your child has a fever.    Your child has new symptoms.     Your child's symptoms do not get better with treatment.     You have questions or concerns about your child's condition or care.    Medicines may be given to decrease pain, treat a bacterial infection, or manage your child's symptoms. Give your child's medicine as directed. Call your child's healthcare provider if you think the medicine is not working as expected. Tell him if your child is allergic to any medicine. Keep a current list of the medicines, vitamins, and herbs your child takes. Include the amounts, and when, how, and why they are taken. Bring the list or the medicines in their containers to follow-up visits. Carry your child's medicine list with you in case of an emergency.    Care for your child:     Apply heat on your child's abdomen for 20 to 30 minutes every 2 hours. Do this for as many days as directed. Heat helps decrease pain and muscle spasms.    Help your child manage stress. Your child's healthcare provider may recommend relaxation techniques and deep breathing exercises to help decrease your child's stress. The provider may recommend that your child talk to someone about his or her stress or anxiety, such as a school counselor.     Make changes to the foods you give to your child as directed.  Give your child more fiber if he has constipation. High-fiber foods include fruits, vegetables, whole-grain foods, and legumes.     Do not give your child foods that cause gas, such as broccoli, cabbage, and cauliflower. Do not give him soda or carbonated drinks, because these may also cause gas.     Do not give your child foods or drinks that contain sorbitol or fructose if he has diarrhea and bloating. Some examples are fruit juices, candy, jelly, and sugar-free gum. Do not give him high-fat foods, such as fried foods, cheeseburgers, hot dogs, and desserts.    Give your child small meals more often. This may help decrease his abdominal pain.     Follow up with your child's healthcare provider as directed: Write down your questions so you remember to ask them during your child's visits. memory box/yes

## 2022-03-18 ENCOUNTER — APPOINTMENT (OUTPATIENT)
Dept: MAMMOGRAPHY | Facility: CLINIC | Age: 42
End: 2022-03-18

## 2022-04-05 ENCOUNTER — APPOINTMENT (OUTPATIENT)
Dept: INTERNAL MEDICINE | Facility: CLINIC | Age: 42
End: 2022-04-05
Payer: COMMERCIAL

## 2022-04-05 DIAGNOSIS — H00.014 HORDEOLUM EXTERNUM LEFT UPPER EYELID: ICD-10-CM

## 2022-04-05 PROCEDURE — 99212 OFFICE O/P EST SF 10 MIN: CPT | Mod: 95

## 2022-04-05 NOTE — PHYSICAL EXAM
[No Acute Distress] : no acute distress [Well Nourished] : well nourished [Well Developed] : well developed [Well-Appearing] : well-appearing [Normal Voice/Communication] : normal voice/communication [Speech Grossly Normal] : speech grossly normal [Normal Affect] : the affect was normal [Alert and Oriented x3] : oriented to person, place, and time [de-identified] : Has stye on left upper eyelid

## 2022-04-05 NOTE — ASSESSMENT
[FreeTextEntry1] : Left upper eyelid stye\par Apply warm compresses/can use teabag for 5 to 10 minutes 4 times daily\par Continue topical therapy\par Will add oral antibiotic\par To follow-up with ophthalmology as may need drainage\par She will call if her status worsens or does not improve\par She will call for any medical issues\par She will see me as needed\par All of the above was discussed with her and all of her questions were answered\par She verbally confirmed understanding of all of the above and agreement with the above plan\par Prescription sent to pharmacy as requested

## 2022-04-05 NOTE — HISTORY OF PRESENT ILLNESS
[Home] : at home, [unfilled] , at the time of the visit. [Medical Office: ([facility])___] : at the medical office located in  [Verbal consent obtained from patient] : the patient, [unfilled] [FreeTextEntry8] : Patient reports that on the weekend she started to feel a pressure in her left eye.\par It was mostly on the left upper lid area.  She was evaluated in urgent care.  She was told that she had a stye.  She was given topical therapy.  The patient reports that she has been diligent in using the medication.  She does not feel that it is helping enough.  The area remains swollen and tender.  She denies any discharge.  She denies any fevers or chills.  She denies any loss of vision.  She is going away in 8 days and would like to have this cleared up prior to departing for her trip.  She feels fatigued.  She offers no other complaints.

## 2022-04-29 ENCOUNTER — RESULT REVIEW (OUTPATIENT)
Age: 42
End: 2022-04-29

## 2022-04-29 ENCOUNTER — APPOINTMENT (OUTPATIENT)
Dept: ULTRASOUND IMAGING | Facility: CLINIC | Age: 42
End: 2022-04-29
Payer: COMMERCIAL

## 2022-04-29 ENCOUNTER — APPOINTMENT (OUTPATIENT)
Dept: MAMMOGRAPHY | Facility: CLINIC | Age: 42
End: 2022-04-29
Payer: COMMERCIAL

## 2022-04-29 ENCOUNTER — OUTPATIENT (OUTPATIENT)
Dept: OUTPATIENT SERVICES | Facility: HOSPITAL | Age: 42
LOS: 1 days | End: 2022-04-29
Payer: COMMERCIAL

## 2022-04-29 DIAGNOSIS — Z12.39 ENCOUNTER FOR OTHER SCREENING FOR MALIGNANT NEOPLASM OF BREAST: ICD-10-CM

## 2022-04-29 DIAGNOSIS — R92.2 INCONCLUSIVE MAMMOGRAM: ICD-10-CM

## 2022-04-29 PROCEDURE — 76641 ULTRASOUND BREAST COMPLETE: CPT | Mod: 26,50

## 2022-04-29 PROCEDURE — G0279: CPT | Mod: 26

## 2022-04-29 PROCEDURE — G0279: CPT

## 2022-04-29 PROCEDURE — 76641 ULTRASOUND BREAST COMPLETE: CPT

## 2022-04-29 PROCEDURE — 77066 DX MAMMO INCL CAD BI: CPT | Mod: 26

## 2022-04-29 PROCEDURE — 77066 DX MAMMO INCL CAD BI: CPT

## 2022-05-19 ENCOUNTER — APPOINTMENT (OUTPATIENT)
Dept: MRI IMAGING | Facility: CLINIC | Age: 42
End: 2022-05-19
Payer: COMMERCIAL

## 2022-05-19 ENCOUNTER — OUTPATIENT (OUTPATIENT)
Dept: OUTPATIENT SERVICES | Facility: HOSPITAL | Age: 42
LOS: 1 days | End: 2022-05-19
Payer: COMMERCIAL

## 2022-05-19 DIAGNOSIS — Z12.39 ENCOUNTER FOR OTHER SCREENING FOR MALIGNANT NEOPLASM OF BREAST: ICD-10-CM

## 2022-05-19 PROCEDURE — A9585: CPT

## 2022-05-19 PROCEDURE — 77049 MRI BREAST C-+ W/CAD BI: CPT | Mod: 26

## 2022-05-19 PROCEDURE — C8908: CPT

## 2022-05-19 PROCEDURE — C8937: CPT

## 2022-06-08 NOTE — REVIEW OF SYSTEMS
[facility]  DISCHARGE SUMMARY    Patient Name: Sarah Brannon  : 1959  MRN: [id number]    Date of Admission: 2022  Discharge Diagnosis: Transient ischemic attack  Date of Discharge: 2022  Primary Care Physician: Patricia Steele MD      Presenting Problem:   TIA (transient ischemic attack) [G45.9]    Active and Resolved Hospital Problems:  Active Hospital Problems    Diagnosis POA   • TIA (transient ischemic attack) [G45.9] Yes     Priority: High   • Sinusitis [J32.9] Yes     Priority: Medium   • Hypokalemia [E87.6] Yes     Priority: Medium   • Hyperlipidemia [E78.5] Yes     Priority: Medium   • Hypertension [I10] Yes     Priority: Medium   • Obesity (BMI 30.0-34.9) [E66.9] Yes     Priority: Medium   • Migraine headache [G43.909] Yes     Priority: Low      Resolved Hospital Problems   No resolved problems to display.         Hospital Course     Hospital Course:  Sarah Brannon is a 63 y.o. female who was admitted last night after an episode of slurred speech and trouble communicating.  The patient was admitted to neuro unit where she was monitored.  The patient then underwent a neurologic consultation with Dr. Burdick as well as an MRI showed showing small areas of subacute to acute infarct in the area of the right parietal lobe.  There were some mild chronic vessel changes seen as well.  Patient also had a CT a of her head and neck which failed to demonstrate any etiology for her symptomatology.  The patient was then seen by cardiology and will have a 30-day event monitor placed prior to discharge.  The patient was doing well and was ready for discharge.  All of the patient's findings were discussed in detail with the patient and her .    The patient is a full code at the time of discharge.  The patient's medications are as listed in that section of this report.  The patient should have an event monitor placed for 30 days  prior to discharge.  The patient has no other pending studies at the time of discharge.  The patient should follow-up with her primary care provider and 1 week's time and with Dr. Garcia in 2 to 3 weeks or per as per his instructions.  The patient is discharged in satisfactory condition.        DISCHARGE Follow Up Recommendations for labs and diagnostics:   Event monitor for 30 days      Reasons For Change In Medications and Indications for New Medications:      Day of Discharge     Vital Signs:  Temp:  [97.5 °F (36.4 °C)-98.3 °F (36.8 °C)] 97.9 °F (36.6 °C)  Heart Rate:  [57-69] 57  Resp:  [13-21] 15  BP: (107-118)/(42-62) 108/55    Physical Exam:  Physical Exam  Vitals and nursing note reviewed.   Constitutional:       General: She is not in acute distress.     Appearance: Normal appearance. She is well-developed. She is not ill-appearing, toxic-appearing or diaphoretic.   HENT:      Head: Normocephalic and atraumatic.      Right Ear: Ear canal and external ear normal.      Left Ear: Ear canal and external ear normal.      Nose: Nose normal. No congestion or rhinorrhea.      Mouth/Throat:      Mouth: Mucous membranes are moist.      Pharynx: No oropharyngeal exudate.   Eyes:      General: No scleral icterus.        Right eye: No discharge.         Left eye: No discharge.      Extraocular Movements: Extraocular movements intact.      Conjunctiva/sclera: Conjunctivae normal.      Pupils: Pupils are equal, round, and reactive to light.   Neck:      Thyroid: No thyromegaly.      Vascular: No carotid bruit or JVD.      Trachea: No tracheal deviation.   Cardiovascular:      Rate and Rhythm: Normal rate and regular rhythm.      Pulses: Normal pulses.      Heart sounds: Normal heart sounds. No murmur heard.    No friction rub. No gallop.   Pulmonary:      Effort: Pulmonary effort is normal. No respiratory distress.      Breath sounds: Normal breath sounds. No stridor. No wheezing, rhonchi or rales.   Chest:      Chest  wall: No tenderness.   Abdominal:      General: Bowel sounds are normal. There is no distension.      Palpations: Abdomen is soft. There is no mass.      Tenderness: There is no abdominal tenderness. There is no guarding or rebound.      Hernia: No hernia is present.   Musculoskeletal:         General: No swelling, tenderness, deformity or signs of injury. Normal range of motion.      Cervical back: Normal range of motion and neck supple. No rigidity. No muscular tenderness.      Right lower leg: No edema.      Left lower leg: No edema.   Lymphadenopathy:      Cervical: No cervical adenopathy.   Skin:     General: Skin is warm and dry.      Coloration: Skin is not jaundiced or pale.      Findings: No bruising, erythema or rash.   Neurological:      General: No focal deficit present.      Mental Status: She is alert and oriented to person, place, and time. Mental status is at baseline.      Cranial Nerves: No cranial nerve deficit.      Sensory: No sensory deficit.      Motor: No weakness or abnormal muscle tone.      Coordination: Coordination normal.   Psychiatric:         Mood and Affect: Mood normal.         Behavior: Behavior normal.         Thought Content: Thought content normal.         Judgment: Judgment normal.            Pertinent  and/or Most Recent Results     LAB RESULTS:      Lab 06/08/22  0713 06/07/22  0344 06/06/22 2045   WBC 6.30 7.20 7.20   HEMOGLOBIN 12.0 12.0 13.7   HEMATOCRIT 35.9 35.8 41.5   PLATELETS 273 280 299   NEUTROS ABS 3.30  --  3.80   LYMPHS ABS 2.30  --  2.60   MONOS ABS 0.50  --  0.50   EOS ABS 0.30  --  0.40   MCV 91.5 92.3 91.8   PROCALCITONIN  --  0.06  --    PROTIME  --   --  9.9   APTT  --   --  28.8         Lab 06/08/22  0713 06/07/22  0344 06/06/22 2045   SODIUM 142 139 137   POTASSIUM 4.1 3.6 3.2*   CHLORIDE 104 102 98   CO2 28.0 28.0 26.0   ANION GAP 10.0 9.0 13.0   BUN 15 18 19   CREATININE 0.87 0.88 1.03*   EGFR 74.5 73.5 60.8   GLUCOSE 97 92 97   CALCIUM 8.5* 8.3* 8.9    HEMOGLOBIN A1C  --  5.7*  --    TSH  --  1.280  --          Lab 06/07/22  0344 06/06/22 2045   TOTAL PROTEIN 5.9* 6.7   ALBUMIN 3.80 4.10   GLOBULIN 2.1 2.6   ALT (SGPT) 11 13   AST (SGOT) 15 17   BILIRUBIN 0.2 0.2   ALK PHOS 59 69         Lab 06/07/22  0344 06/06/22 2045   TROPONIN T <0.010 <0.010   PROTIME  --  9.9   INR  --  0.96         Lab 06/07/22  0344   CHOLESTEROL 157   LDL CHOL 92   HDL CHOL 37*   TRIGLYCERIDES 163*         Lab 06/06/22 2045   ABO TYPING O   RH TYPING Positive   ANTIBODY SCREEN Negative         Brief Urine Lab Results     None        Microbiology Results (last 10 days)     Procedure Component Value - Date/Time    COVID PRE-OP / PRE-PROCEDURE SCREENING ORDER (NO ISOLATION) - Swab, Nasopharynx [[id number]]  (Normal) Collected: 06/06/22 2157    Lab Status: Final result Specimen: Swab from Nasopharynx Updated: 06/06/22 2238    Narrative:      The following orders were created for panel order COVID PRE-OP / PRE-PROCEDURE SCREENING ORDER (NO ISOLATION) - Swab, Nasopharynx.  Procedure                               Abnormality         Status                     ---------                               -----------         ------                     COVID-19,CEPHEID/VIVIAN,CO...[[id number]]  Normal              Final result                 Please view results for these tests on the individual orders.    COVID-19,CEPHEID/VIVIAN,COR/KYLIE/PAD/ROMI IN-HOUSE(OR EMERGENT/ADD-ON),NP SWAB IN TRANSPORT MEDIA 3-4 HR TAT, RT-PCR - Swab, Nasopharynx [[id number]]  (Normal) Collected: 06/06/22 2157    Lab Status: Final result Specimen: Swab from Nasopharynx Updated: 06/06/22 2238     COVID19 Not Detected    Narrative:      Fact sheet for providers: https://www.fda.gov/media/807844/download     Fact sheet for patients: https://www.fda.gov/media/078336/download  Fact sheet for providers: https://www.fda.gov/media/190333/download     Fact sheet for patients: https://www.fda.gov/media/062814/download          CT Angiogram  Neck    Result Date: 6/6/2022  Impression:  1. No large vessel occlusion or acute cerebral thrombus. 2. Variant anatomy as discussed above. 3. Carotid artery atherosclerotic vascular disease with no hemodynamically significant stenosis. 4. Additional vascular and nonvascular findings as noted above.  Electronically Signed By-Rei Smiley MD On:6/6/2022 9:38 PM This report was finalized on [id number] by  Rei Smiley MD.    MRI Brain Without Contrast    Result Date: 6/7/2022  Impression:  1. Small areas of acute/subacute infarct in the right parietal lobe. No hemorrhage. 2. Mild chronic small vessel ischemic change and mild generalized parenchymal volume loss. 3. Paranasal sinus mucosal disease with a fluid level in the left maxillary sinus suggesting acute sinusitis.  Electronically Signed By-Leroy Medina MD On:6/7/2022 8:45 AM This report was finalized on [id number] by  Leroy Medina MD.    XR Chest 1 View    Result Date: 6/6/2022  Impression: No active disease.  Electronically Signed By-Rei Smiley MD On:6/6/2022 9:28 PM This report was finalized on [id number] by  Rei Smiley MD.    CT Head Without Contrast Stroke Protocol    Result Date: 6/6/2022  Impression: IMPRESSION :  1. Volume loss secondary to cerebral atrophy. 2. Suggestion of chronic microvascular ischemia. 3. No acute intracranial findings. 4. Acute and chronic paranasal sinus disease.  Electronically Signed By-Rei Smiley MD On:6/6/2022 8:44 PM This report was finalized on [id number] by  Rei Smiley MD.    CT Angiogram Head w AI Analysis of LVO    Result Date: 6/6/2022  Impression:  1. No large vessel occlusion or acute cerebral thrombus. 2. Variant anatomy as discussed above. 3. Carotid artery atherosclerotic vascular disease with no hemodynamically significant stenosis. 4. Additional vascular and nonvascular findings as noted above.  Electronically Signed By-Rei Smiley MD On:6/6/2022 9:38 PM This report was finalized on  [id number] by  Rei Smiley MD.    CT CEREBRAL PERFUSION WITH & WITHOUT CONTRAST    Result Date: 6/6/2022  Impression: Normal study.  Electronically Signed By-Rei Smiley MD On:6/6/2022 9:08 PM This report was finalized on [id number] by  Rei Smiley MD.              Results for orders placed during the hospital encounter of 06/06/22    Adult Transthoracic Echo Complete W/ Cont if Necessary Per Protocol (With Agitated Saline)    Interpretation Summary  · Left ventricular ejection fraction appears to be 56 - 60%.  · Saline test results are negative.  · No pericardial effusion noted      Labs Pending at Discharge:      Procedures Performed           Consults:   Consults     Date and Time Order Name Status Description    6/7/2022  1:32 PM Inpatient Cardiology Consult Completed     6/6/2022  9:46 PM Inpatient Neurology Consult Stroke      6/6/2022  9:29 PM Hospitalist (on-call MD unless specified)      6/6/2022  8:36 PM Inpatient Neurology Consult Stroke      6/6/2022  8:36 PM Inpatient Neurology Consult Stroke Completed             Discharge Details        Discharge Medications      New Medications      Instructions Start Date   atorvastatin 40 MG tablet  Commonly known as: LIPITOR  Replaces: pravastatin 80 MG tablet   40 mg, Oral, Nightly         Continue These Medications      Instructions Start Date   buPROPion  MG 24 hr tablet  Commonly known as: WELLBUTRIN XL   300 mg, Oral, Every Morning      cetirizine 10 MG tablet  Commonly known as: zyrTEC   TAKE 1 TABLET BY MOUTH EVERY DAY       clobetasol 0.05 % cream  Commonly known as: TEMOVATE   Topical, 2 Times Daily      metoprolol succinate XL 50 MG 24 hr tablet  Commonly known as: TOPROL-XL   TAKE 1 TABLET BY MOUTH EVERY DAY      tiZANidine 4 MG tablet  Commonly known as: ZANAFLEX   4 mg, Oral, Nightly PRN      venlafaxine XR 75 MG 24 hr capsule  Commonly known as: EFFEXOR-XR   225 mg, Oral, Daily   Start Date: June 9, 2022        Stop These Medications     pravastatin 80 MG tablet  Commonly known as: PRAVACHOL  Replaced by: atorvastatin 40 MG tablet     triamterene-hydrochlorothiazide 75-50 MG per tablet  Commonly known as: MAXZIDE            Allergies   Allergen Reactions   • Bee Venom Swelling   • Shellfish-Derived Products Swelling     Lips became swollen.         Discharge Disposition: Home      Diet:  Hospital:  Diet Order   Procedures   • Diet Regular         Discharge Activity:   Activity Instructions     Activity as Tolerated              CODE STATUS:  Code Status and Medical Interventions:   Ordered at: 06/06/22 6440     Code Status (Patient has no pulse and is not breathing):    CPR (Attempt to Resuscitate)     Medical Interventions (Patient has pulse or is breathing):    Full Support         Future Appointments   Date Time Provider Department Center   6/15/2022 10:30 AM [facility] US 1 [facility] US [facility]   6/15/2022 11:00 AM [facility] JOSE 3 BH [facility] MAMMO [facility]   6/22/2022  8:00 AM Seipel, Joseph F, MD [facility]   9/14/2022  8:00 AM Patricia Steele MD [facility]   11/22/2022 11:15 AM Patricia Steele MD [facility]       Additional Instructions for the Follow-ups that You Need to Schedule     Discharge Follow-up with PCP   As directed       Currently Documented PCP:    Patricia Steele MD    PCP Phone Number:    [phone]     Follow Up Details: Follow-up with primary care in 1 week's time         Discharge Follow-up with Specialty: Follow-up with Dr. Sandhu in 1 to 2 weeks with event monitor   As directed      Specialty: Follow-up with Dr. Sandhu in 1 to 2 weeks with event monitor               Time spent on Discharge including face to face service:  35 minutes    This patient has been examined wearing appropriate Personal Protective Equipment and discussed with hospital infection control department. 06/08/22      Signature: Electronically signed by Teetee Thurston MD, 06/08/22, 2:24 PM EDT.       [Negative] : Heme/Lymph [Palpitations] : no palpitations

## 2022-06-27 ENCOUNTER — NON-APPOINTMENT (OUTPATIENT)
Age: 42
End: 2022-06-27

## 2022-06-30 ENCOUNTER — NON-APPOINTMENT (OUTPATIENT)
Age: 42
End: 2022-06-30

## 2022-08-28 ENCOUNTER — NON-APPOINTMENT (OUTPATIENT)
Age: 42
End: 2022-08-28

## 2022-10-21 ENCOUNTER — TRANSCRIPTION ENCOUNTER (OUTPATIENT)
Age: 42
End: 2022-10-21

## 2023-05-14 NOTE — DISCHARGE NOTE OB - MEDICATION SUMMARY - MEDICATIONS TO TAKE
A/P: 44yo POD#2 s/p rLTCS+BS,  c/b gHTN. 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  Patient is stable and doing well post-operatively.      #gHTN  - BP wnl overnight  - HELLP labs wnl  - No sxs sPEC    #PP  - Continue regular diet.  - Increase ambulation.  - Continue motrin, tylenol; encouraged patient to take oxycodone for severe pain  - Senna ordered per patient request    RADHA Ram PGY1 I will START or STAY ON the medications listed below when I get home from the hospital:    acetaminophen 325 mg oral tablet  -- 3 tab(s) by mouth every 6 hours  -- Indication: For Maternal care for intrauterine death    ibuprofen 600 mg oral tablet  -- 1 tab(s) by mouth every 6 hours  -- Indication: For Maternal care for intrauterine death    amoxicillin 500 mg oral tablet  -- 1 tab(s) by mouth every 12 hours   -- Finish all this medication unless otherwise directed by prescriber.    -- Indication: For Maternal care for intrauterine death

## 2023-05-15 ENCOUNTER — NON-APPOINTMENT (OUTPATIENT)
Age: 43
End: 2023-05-15

## 2023-05-17 ENCOUNTER — APPOINTMENT (OUTPATIENT)
Dept: INTERNAL MEDICINE | Facility: CLINIC | Age: 43
End: 2023-05-17
Payer: COMMERCIAL

## 2023-05-17 ENCOUNTER — NON-APPOINTMENT (OUTPATIENT)
Age: 43
End: 2023-05-17

## 2023-05-17 VITALS
WEIGHT: 109 LBS | OXYGEN SATURATION: 99 % | DIASTOLIC BLOOD PRESSURE: 60 MMHG | HEIGHT: 63 IN | HEART RATE: 59 BPM | TEMPERATURE: 98 F | BODY MASS INDEX: 19.31 KG/M2 | SYSTOLIC BLOOD PRESSURE: 108 MMHG

## 2023-05-17 PROCEDURE — 99396 PREV VISIT EST AGE 40-64: CPT | Mod: 25

## 2023-05-17 PROCEDURE — 93000 ELECTROCARDIOGRAM COMPLETE: CPT

## 2023-05-17 RX ORDER — BACILLUS COAGULANS/INULIN 1B-250 MG
CAPSULE ORAL
Refills: 0 | Status: DISCONTINUED | COMMUNITY
End: 2023-05-17

## 2023-05-17 RX ORDER — SULFAMETHOXAZOLE AND TRIMETHOPRIM 800; 160 MG/1; MG/1
800-160 TABLET ORAL TWICE DAILY
Qty: 14 | Refills: 0 | Status: DISCONTINUED | COMMUNITY
Start: 2022-04-05 | End: 2023-05-17

## 2023-05-17 NOTE — PAST MEDICAL HISTORY
[Menarche Age ____] : age at menarche was [unfilled] [Total Preg ___] : G[unfilled] [Live Births ___] : P[unfilled]  [Premature ___] : Premature: [unfilled] [Living ___] : Living: [unfilled] [AB Spont ___] : miscarriages: [unfilled]  [Menstruating] : menstruating [Definite ___ (Date)] : the last menstrual period was [unfilled] [Regular Cycle Intervals] : have been regular

## 2023-05-17 NOTE — PHYSICAL EXAM
[No Acute Distress] : no acute distress [Well Nourished] : well nourished [Well Developed] : well developed [Well-Appearing] : well-appearing [Normal Voice/Communication] : normal voice/communication [Normal Sclera/Conjunctiva] : normal sclera/conjunctiva [PERRL] : pupils equal round and reactive to light [EOMI] : extraocular movements intact [Normal Outer Ear/Nose] : the outer ears and nose were normal in appearance [Normal TMs] : both tympanic membranes were normal [No JVD] : no jugular venous distention [No Lymphadenopathy] : no lymphadenopathy [Supple] : supple [No Respiratory Distress] : no respiratory distress  [No Accessory Muscle Use] : no accessory muscle use [Clear to Auscultation] : lungs were clear to auscultation bilaterally [Normal Rate] : normal rate  [Regular Rhythm] : with a regular rhythm [Normal S1, S2] : normal S1 and S2 [No Carotid Bruits] : no carotid bruits [Pedal Pulses Present] : the pedal pulses are present [No Edema] : there was no peripheral edema [No Extremity Clubbing/Cyanosis] : no extremity clubbing/cyanosis [No Nipple Discharge] : no nipple discharge [No Axillary Lymphadenopathy] : no axillary lymphadenopathy [Soft] : abdomen soft [Non Tender] : non-tender [Non-distended] : non-distended [No Masses] : no abdominal mass palpated [No HSM] : no HSM [Normal Bowel Sounds] : normal bowel sounds [Declined Rectal Exam] : declined rectal exam [No CVA Tenderness] : no CVA  tenderness [No Spinal Tenderness] : no spinal tenderness [Grossly Normal Strength/Tone] : grossly normal strength/tone [No Rash] : no rash [Coordination Grossly Intact] : coordination grossly intact [No Focal Deficits] : no focal deficits [Normal Gait] : normal gait [Speech Grossly Normal] : speech grossly normal [Normal Affect] : the affect was normal [Alert and Oriented x3] : oriented to person, place, and time [Normal Oropharynx] : the oropharynx was normal [Normal Supraclavicular Nodes] : no supraclavicular lymphadenopathy [Normal Axillary Nodes] : no axillary lymphadenopathy [Normal Posterior Cervical Nodes] : no posterior cervical lymphadenopathy [Normal Anterior Cervical Nodes] : no anterior cervical lymphadenopathy [de-identified] : No sinus tenderness [de-identified] : No stridor or TM [de-identified] : R = 16 [de-identified] : Normal bilateral radial pulses; no cords [de-identified] : Bilateral implants [de-identified] : As per gynecology

## 2023-05-17 NOTE — HEALTH RISK ASSESSMENT
[Very Good] : ~his/her~ current health as very good [Excellent] : ~his/her~  mood as  excellent [Intercurrent Urgi Care visits] : went to urgent care [Yes] : Yes [4 or more  times a week (4 pts)] : 4 or more  times a week (4 points) [1 or 2 (0 pts)] : 1 or 2 (0 points) [Never (0 pts)] : Never (0 points) [No] : In the past 12 months have you used drugs other than those required for medical reasons? No [0] : 2) Feeling down, depressed, or hopeless: Not at all (0) [PHQ-2 Negative - No further assessment needed] : PHQ-2 Negative - No further assessment needed [Patient declined mammogram] : Patient declined mammogram [None] : None [With Family] : lives with family [# of Members in Household ___] :  household currently consist of [unfilled] member(s) [Unemployed] : unemployed [Graduate School] : graduate school [] :  [# Of Children ___] : has [unfilled] children [Feels Safe at Home] : Feels safe at home [Fully functional (bathing, dressing, toileting, transferring, walking, feeding)] : Fully functional (bathing, dressing, toileting, transferring, walking, feeding) [Fully functional (using the telephone, shopping, preparing meals, housekeeping, doing laundry, using] : Fully functional and needs no help or supervision to perform IADLs (using the telephone, shopping, preparing meals, housekeeping, doing laundry, using transportation, managing medications and managing finances) [Smoke Detector] : smoke detector [Carbon Monoxide Detector] : carbon monoxide detector [Guns at Home] : guns at home [Seat Belt] :  uses seat belt [Sunscreen] : uses sunscreen [With Patient/Caregiver] : , with patient/caregiver [Designated Healthcare Proxy] : Designated healthcare proxy [Name: ___] : Health Care Proxy's Name: [unfilled]  [Relationship: ___] : Relationship: [unfilled] [No falls in past year] : Patient reported no falls in the past year [PHQ-9 Negative - No further assessment needed] : PHQ-9 Negative - No further assessment needed [HIV Test offered] : HIV Test offered [Hepatitis C test offered] : Hepatitis C test offered [Reports changes in hearing] : Reports changes in hearing [Reports changes in vision] : Reports changes in vision [Never] : Never [FreeTextEntry1] : none [de-identified] : cardiology, GYN, dentist, derm [de-identified] : exercises [de-identified] : regular [[id number]] : 0 [Change in mental status noted] : No change in mental status noted [Reports changes in dental health] : Reports no changes in dental health [Travel to Developing Areas] : does not  travel to developing areas [TB Exposure] : is not being exposed to tuberculosis [Caregiver Concerns] : does not have caregiver concerns [MammogramDate] : 2022 [PapSmearDate] : 2022 [de-identified] : volunteers [AdvancecareDate] : 05/23

## 2023-05-17 NOTE — HISTORY OF PRESENT ILLNESS
[FreeTextEntry1] : Comes in for annual physical. [de-identified] : Feeling well.\par Recovered from covid.

## 2023-05-17 NOTE — ASSESSMENT
[FreeTextEntry1] : See health maintenance assessment and plan outlined below.\par In addition:\par Urine testing and labs sent today = will contact patient with results\par Follow-up with podiatry or foot orthopedics regarding bunions\par GI follow-up in 2023 for repeat colonoscopy \par Saw GYN 2022 = due for follow-up in 2023\par She refuses to do follow-up breast MRI\par Prescription given to do enhanced mammogram at her request for 2023\par She plans to follow-up with her breast surgeon 2023\par Continue diet and exercise as outlined\par Cardiology follow-up 2023\par See scanned EKG = reviewed with patient\par She recently had chest imaging\par She declined PFTs/CXR\par Vaccines reviewed and declined\par ENT follow-up 2023 as needed\par Dermatology evaluation 2023 for full skin check\par Ophthalmology follow-up 2023 for complete eye exam\par Dental follow-up 2023\par She will return in follow-up for her annual physical and as needed\par She will call for any medical issues\par All of the above was discussed in detail with her and all of her questions were answered\par She verbally confirmed understanding of all of the above and agreement with the above plan

## 2023-05-22 LAB
25(OH)D3 SERPL-MCNC: 31.2 NG/ML
ALBUMIN SERPL ELPH-MCNC: 4.5 G/DL
ALP BLD-CCNC: 34 U/L
ALT SERPL-CCNC: 9 U/L
ANION GAP SERPL CALC-SCNC: 12 MMOL/L
APO LP(A) SERPL-MCNC: 160.3 NMOL/L
APPEARANCE: CLEAR
AST SERPL-CCNC: 12 U/L
BACTERIA: NEGATIVE /HPF
BASOPHILS # BLD AUTO: 0.03 K/UL
BASOPHILS NFR BLD AUTO: 0.5 %
BILIRUB SERPL-MCNC: 0.6 MG/DL
BILIRUBIN URINE: NEGATIVE
BLOOD URINE: ABNORMAL
BUN SERPL-MCNC: 16 MG/DL
CALCIUM SERPL-MCNC: 9.6 MG/DL
CAST: 0 /LPF
CHLORIDE SERPL-SCNC: 102 MMOL/L
CHOLEST SERPL-MCNC: 207 MG/DL
CO2 SERPL-SCNC: 23 MMOL/L
COLOR: YELLOW
COVID-19 NUCLEOCAPSID  GAM ANTIBODY INTERPRETATION: POSITIVE
COVID-19 SPIKE DOMAIN ANTIBODY INTERPRETATION: POSITIVE
CREAT SERPL-MCNC: 0.75 MG/DL
EGFR: 101 ML/MIN/1.73M2
EOSINOPHIL # BLD AUTO: 0.04 K/UL
EOSINOPHIL NFR BLD AUTO: 0.7 %
EPITHELIAL CELLS: 5 /HPF
ESTIMATED AVERAGE GLUCOSE: 97 MG/DL
FOLATE SERPL-MCNC: 19.8 NG/ML
GLUCOSE QUALITATIVE U: NEGATIVE MG/DL
GLUCOSE SERPL-MCNC: 86 MG/DL
HBA1C MFR BLD HPLC: 5 %
HCT VFR BLD CALC: 36.9 %
HCV AB SER QL: NONREACTIVE
HCV S/CO RATIO: 0.2 S/CO
HDLC SERPL-MCNC: 92 MG/DL
HGB BLD-MCNC: 12.1 G/DL
HIV1+2 AB SPEC QL IA.RAPID: NONREACTIVE
IMM GRANULOCYTES NFR BLD AUTO: 0 %
IRON SERPL-MCNC: 98 UG/DL
KETONES URINE: ABNORMAL MG/DL
LDLC SERPL CALC-MCNC: 104 MG/DL
LEUKOCYTE ESTERASE URINE: ABNORMAL
LYMPHOCYTES # BLD AUTO: 1.61 K/UL
LYMPHOCYTES NFR BLD AUTO: 26.4 %
MAN DIFF?: NORMAL
MCHC RBC-ENTMCNC: 32.3 PG
MCHC RBC-ENTMCNC: 32.8 GM/DL
MCV RBC AUTO: 98.4 FL
MICROSCOPIC-UA: NORMAL
MONOCYTES # BLD AUTO: 0.45 K/UL
MONOCYTES NFR BLD AUTO: 7.4 %
NEUTROPHILS # BLD AUTO: 3.97 K/UL
NEUTROPHILS NFR BLD AUTO: 65 %
NITRITE URINE: NEGATIVE
NONHDLC SERPL-MCNC: 114 MG/DL
PH URINE: 6
PLATELET # BLD AUTO: 226 K/UL
POTASSIUM SERPL-SCNC: 4.2 MMOL/L
PROT SERPL-MCNC: 6.8 G/DL
PROTEIN URINE: NEGATIVE MG/DL
RBC # BLD: 3.75 M/UL
RBC # FLD: 12.8 %
RED BLOOD CELLS URINE: 3 /HPF
REVIEW: NORMAL
SARS-COV-2 AB SERPL IA-ACNC: >250 U/ML
SARS-COV-2 AB SERPL QL IA: 74 INDEX
SODIUM SERPL-SCNC: 138 MMOL/L
SPECIFIC GRAVITY URINE: 1.01
TRIGL SERPL-MCNC: 51 MG/DL
TSH SERPL-ACNC: 0.85 UIU/ML
UROBILINOGEN URINE: 0.2 MG/DL
VIT B12 SERPL-MCNC: 390 PG/ML
WBC # FLD AUTO: 6.1 K/UL
WHITE BLOOD CELLS URINE: 3 /HPF

## 2023-06-02 ENCOUNTER — OUTPATIENT (OUTPATIENT)
Dept: OUTPATIENT SERVICES | Facility: HOSPITAL | Age: 43
LOS: 1 days | End: 2023-06-02
Payer: COMMERCIAL

## 2023-06-02 ENCOUNTER — RESULT REVIEW (OUTPATIENT)
Age: 43
End: 2023-06-02

## 2023-06-02 ENCOUNTER — APPOINTMENT (OUTPATIENT)
Dept: MAMMOGRAPHY | Facility: IMAGING CENTER | Age: 43
End: 2023-06-02
Payer: COMMERCIAL

## 2023-06-02 DIAGNOSIS — Z00.8 ENCOUNTER FOR OTHER GENERAL EXAMINATION: ICD-10-CM

## 2023-06-02 PROCEDURE — 77063 BREAST TOMOSYNTHESIS BI: CPT | Mod: 26

## 2023-06-02 PROCEDURE — 77067 SCR MAMMO BI INCL CAD: CPT | Mod: 26

## 2023-06-02 PROCEDURE — 76642 ULTRASOUND BREAST LIMITED: CPT | Mod: 26,50

## 2023-06-02 PROCEDURE — 77067 SCR MAMMO BI INCL CAD: CPT

## 2023-06-02 PROCEDURE — 76642 ULTRASOUND BREAST LIMITED: CPT

## 2023-06-02 PROCEDURE — 77063 BREAST TOMOSYNTHESIS BI: CPT

## 2023-09-14 ENCOUNTER — APPOINTMENT (OUTPATIENT)
Dept: ORTHOPEDIC SURGERY | Facility: CLINIC | Age: 43
End: 2023-09-14
Payer: COMMERCIAL

## 2023-09-14 DIAGNOSIS — M20.12 HALLUX VALGUS (ACQUIRED), RIGHT FOOT: ICD-10-CM

## 2023-09-14 DIAGNOSIS — M20.11 HALLUX VALGUS (ACQUIRED), RIGHT FOOT: ICD-10-CM

## 2023-09-14 PROCEDURE — 99203 OFFICE O/P NEW LOW 30 MIN: CPT

## 2023-09-14 PROCEDURE — 73630 X-RAY EXAM OF FOOT: CPT | Mod: RT

## 2023-09-28 ENCOUNTER — APPOINTMENT (OUTPATIENT)
Dept: MRI IMAGING | Facility: CLINIC | Age: 43
End: 2023-09-28

## 2023-10-02 ENCOUNTER — APPOINTMENT (OUTPATIENT)
Dept: MRI IMAGING | Facility: CLINIC | Age: 43
End: 2023-10-02
Payer: COMMERCIAL

## 2023-10-02 PROCEDURE — 73718 MRI LOWER EXTREMITY W/O DYE: CPT | Mod: RT

## 2023-10-05 ENCOUNTER — APPOINTMENT (OUTPATIENT)
Dept: ORTHOPEDIC SURGERY | Facility: CLINIC | Age: 43
End: 2023-10-05
Payer: COMMERCIAL

## 2023-10-05 VITALS — WEIGHT: 109 LBS | BODY MASS INDEX: 19.31 KG/M2 | HEIGHT: 63 IN

## 2023-10-05 DIAGNOSIS — M77.41 METATARSALGIA, RIGHT FOOT: ICD-10-CM

## 2023-10-05 DIAGNOSIS — M20.11 HALLUX VALGUS (ACQUIRED), RIGHT FOOT: ICD-10-CM

## 2023-10-05 DIAGNOSIS — M84.30XA STRESS FRACTURE, UNSPECIFIED SITE, INITIAL ENCOUNTER FOR FRACTURE: ICD-10-CM

## 2023-10-05 PROCEDURE — 99214 OFFICE O/P EST MOD 30 MIN: CPT

## 2023-10-30 ENCOUNTER — NON-APPOINTMENT (OUTPATIENT)
Age: 43
End: 2023-10-30

## 2023-11-27 ENCOUNTER — APPOINTMENT (OUTPATIENT)
Dept: ORTHOPEDIC SURGERY | Facility: HOSPITAL | Age: 43
End: 2023-11-27

## 2024-05-22 ENCOUNTER — NON-APPOINTMENT (OUTPATIENT)
Age: 44
End: 2024-05-22

## 2024-05-22 ENCOUNTER — APPOINTMENT (OUTPATIENT)
Dept: INTERNAL MEDICINE | Facility: CLINIC | Age: 44
End: 2024-05-22
Payer: COMMERCIAL

## 2024-05-22 VITALS
BODY MASS INDEX: 18.55 KG/M2 | DIASTOLIC BLOOD PRESSURE: 70 MMHG | SYSTOLIC BLOOD PRESSURE: 90 MMHG | OXYGEN SATURATION: 99 % | HEART RATE: 77 BPM | WEIGHT: 106 LBS | HEIGHT: 63.5 IN | TEMPERATURE: 98.4 F

## 2024-05-22 DIAGNOSIS — Z83.6 FAMILY HISTORY OF OTHER DISEASES OF THE RESPIRATORY SYSTEM: ICD-10-CM

## 2024-05-22 DIAGNOSIS — Z00.00 ENCOUNTER FOR GENERAL ADULT MEDICAL EXAMINATION W/OUT ABNORMAL FINDINGS: ICD-10-CM

## 2024-05-22 PROCEDURE — 93000 ELECTROCARDIOGRAM COMPLETE: CPT

## 2024-05-22 PROCEDURE — 99396 PREV VISIT EST AGE 40-64: CPT | Mod: 25

## 2024-05-22 NOTE — PAST MEDICAL HISTORY
[Menstruating] : menstruating [Menarche Age ____] : age at menarche was [unfilled] [Regular Cycle Intervals] : have been regular [Total Preg ___] : G[unfilled] [Live Births ___] : P[unfilled]  [Premature ___] : Premature: [unfilled] [Living ___] : Living: [unfilled] [AB Spont ___] : miscarriages: [unfilled]  [Normal Amount/Duration] : it was of a normal amount and duration

## 2024-05-22 NOTE — REVIEW OF SYSTEMS
[Joint Pain] : joint pain [Back Pain] : back pain [Negative] : Heme/Lymph [Sore Throat] : sore throat

## 2024-05-22 NOTE — PHYSICAL EXAM
[No Acute Distress] : no acute distress [Well Nourished] : well nourished [Well Developed] : well developed [Well-Appearing] : well-appearing [Normal Voice/Communication] : normal voice/communication [Normal Sclera/Conjunctiva] : normal sclera/conjunctiva [PERRL] : pupils equal round and reactive to light [EOMI] : extraocular movements intact [Normal Outer Ear/Nose] : the outer ears and nose were normal in appearance [Normal Oropharynx] : the oropharynx was normal [Normal TMs] : both tympanic membranes were normal [No JVD] : no jugular venous distention [No Lymphadenopathy] : no lymphadenopathy [Supple] : supple [No Respiratory Distress] : no respiratory distress  [No Accessory Muscle Use] : no accessory muscle use [Clear to Auscultation] : lungs were clear to auscultation bilaterally [Normal Rate] : normal rate  [Regular Rhythm] : with a regular rhythm [Normal S1, S2] : normal S1 and S2 [No Carotid Bruits] : no carotid bruits [Pedal Pulses Present] : the pedal pulses are present [No Edema] : there was no peripheral edema [No Extremity Clubbing/Cyanosis] : no extremity clubbing/cyanosis [No Nipple Discharge] : no nipple discharge [No Axillary Lymphadenopathy] : no axillary lymphadenopathy [Soft] : abdomen soft [Non Tender] : non-tender [Non-distended] : non-distended [No Masses] : no abdominal mass palpated [No HSM] : no HSM [Normal Bowel Sounds] : normal bowel sounds [Declined Rectal Exam] : declined rectal exam [Normal Supraclavicular Nodes] : no supraclavicular lymphadenopathy [Normal Axillary Nodes] : no axillary lymphadenopathy [Normal Posterior Cervical Nodes] : no posterior cervical lymphadenopathy [Normal Anterior Cervical Nodes] : no anterior cervical lymphadenopathy [No CVA Tenderness] : no CVA  tenderness [No Spinal Tenderness] : no spinal tenderness [Grossly Normal Strength/Tone] : grossly normal strength/tone [No Rash] : no rash [Coordination Grossly Intact] : coordination grossly intact [No Focal Deficits] : no focal deficits [Normal Gait] : normal gait [Speech Grossly Normal] : speech grossly normal [Normal Affect] : the affect was normal [Alert and Oriented x3] : oriented to person, place, and time [de-identified] : No sinus tenderness; posterior pharynx reddened but without exudate [de-identified] : No stridor or TM [de-identified] : R = 16 [de-identified] : Normal bilateral radial pulses; no cords [de-identified] : Bilateral implants removed/postoperative scarring noted [de-identified] : As per gynecology

## 2024-05-22 NOTE — HEALTH RISK ASSESSMENT
[Yes] : Yes [4 or more  times a week (4 pts)] : 4 or more  times a week (4 points) [1 or 2 (0 pts)] : 1 or 2 (0 points) [Never (0 pts)] : Never (0 points) [No] : In the past 12 months have you used drugs other than those required for medical reasons? No [No falls in past year] : Patient reported no falls in the past year [0] : 2) Feeling down, depressed, or hopeless: Not at all (0) [PHQ-2 Negative - No further assessment needed] : PHQ-2 Negative - No further assessment needed [PHQ-9 Negative - No further assessment needed] : PHQ-9 Negative - No further assessment needed [Never] : Never [HIV Test offered] : HIV Test offered [Hepatitis C test offered] : Hepatitis C test offered [None] : None [With Family] : lives with family [# of Members in Household ___] :  household currently consist of [unfilled] member(s) [Unemployed] : unemployed [Graduate School] : graduate school [] :  [# Of Children ___] : has [unfilled] children [Feels Safe at Home] : Feels safe at home [Fully functional (bathing, dressing, toileting, transferring, walking, feeding)] : Fully functional (bathing, dressing, toileting, transferring, walking, feeding) [Fully functional (using the telephone, shopping, preparing meals, housekeeping, doing laundry, using] : Fully functional and needs no help or supervision to perform IADLs (using the telephone, shopping, preparing meals, housekeeping, doing laundry, using transportation, managing medications and managing finances) [Reports changes in vision] : Reports changes in vision [Smoke Detector] : smoke detector [Carbon Monoxide Detector] : carbon monoxide detector [Seat Belt] :  uses seat belt [Sunscreen] : uses sunscreen [With Patient/Caregiver] : , with patient/caregiver [Designated Healthcare Proxy] : Designated healthcare proxy [Name: ___] : Health Care Proxy's Name: [unfilled]  [Relationship: ___] : Relationship: [unfilled] [Excellent] : ~his/her~ current health as excellent [de-identified] : ortho, GYN, dentist, ophtho, derm, GI [de-identified] : exercises [de-identified] : regular [[id number]] : 0 [Change in mental status noted] : No change in mental status noted [Reports changes in hearing] : Reports no changes in hearing [Reports changes in dental health] : Reports no changes in dental health [Travel to Developing Areas] : does not  travel to developing areas [TB Exposure] : is not being exposed to tuberculosis [Caregiver Concerns] : does not have caregiver concerns [MammogramDate] : 2023 [PapSmearDate] : 2023 [BoneDensityDate] : NA [ColonoscopyDate] : 2023 [de-identified] : volunteers [AdvancecareDate] : 05/24 [FreeTextEntry4] : [phone]

## 2024-05-22 NOTE — HISTORY OF PRESENT ILLNESS
[FreeTextEntry1] : Comes in for annual physical. [de-identified] : Feeling well. Has noticed red scratchy throat this morning.  Did have recent air travel.

## 2024-05-22 NOTE — ASSESSMENT
[FreeTextEntry1] : See health maintenance assessment and plan outlined below. In addition: Urine testing and labs sent today = will contact patient with results Podiatry/orthopedic follow-up 2024 as needed GI follow-up 2024///had colonoscopy 2024 Saw GYN 2023 = due for follow-up in 2024 To do follow-up mammography June 2024 as last done June 2023 She plans to follow-up with her breast surgeon 2024 Continue meds, diet and exercise as outlined Cardiology follow-up 2024 See scanned EKG = reviewed with patient She declined PFTs/CXR Vaccines reviewed and declined ENT follow-up 2024 as needed Dermatology evaluation 2024 for full skin check Ophthalmology follow-up 2024 for complete eye exam Dental follow-up 2024 She declined RVP/point-of-care testing rapid strep testing done and negative She will return in follow-up for her annual physical and as needed She will call for any medical issues or if her status worsens or changes and return to be seen immediately All of the above was discussed in detail with her and all of her questions were answered She verbally confirmed understanding of all of the above and agreement with the above plan

## 2024-05-24 LAB
25(OH)D3 SERPL-MCNC: 40.9 NG/ML
ALBUMIN SERPL ELPH-MCNC: 4.6 G/DL
ALP BLD-CCNC: 44 U/L
ALT SERPL-CCNC: 15 U/L
ANION GAP SERPL CALC-SCNC: 11 MMOL/L
APPEARANCE: CLEAR
AST SERPL-CCNC: 16 U/L
BACTERIA: ABNORMAL /HPF
BASOPHILS # BLD AUTO: 0.02 K/UL
BASOPHILS NFR BLD AUTO: 0.5 %
BILIRUB SERPL-MCNC: 0.6 MG/DL
BILIRUBIN URINE: NEGATIVE
BLOOD URINE: ABNORMAL
BUN SERPL-MCNC: 13 MG/DL
CALCIUM SERPL-MCNC: 9.6 MG/DL
CAST: 0 /LPF
CHLORIDE SERPL-SCNC: 102 MMOL/L
CHOLEST SERPL-MCNC: 227 MG/DL
CO2 SERPL-SCNC: 26 MMOL/L
COLOR: YELLOW
COVID-19 NUCLEOCAPSID  GAM ANTIBODY INTERPRETATION: POSITIVE
COVID-19 SPIKE DOMAIN ANTIBODY INTERPRETATION: POSITIVE
CREAT SERPL-MCNC: 0.83 MG/DL
EGFR: 89 ML/MIN/1.73M2
EOSINOPHIL # BLD AUTO: 0.07 K/UL
EOSINOPHIL NFR BLD AUTO: 1.6 %
EPITHELIAL CELLS: 6 /HPF
ESTIMATED AVERAGE GLUCOSE: 94 MG/DL
FOLATE SERPL-MCNC: 9 NG/ML
GLUCOSE QUALITATIVE U: NEGATIVE MG/DL
GLUCOSE SERPL-MCNC: 93 MG/DL
HBA1C MFR BLD HPLC: 4.9 %
HCT VFR BLD CALC: 42.8 %
HCV AB SER QL: NONREACTIVE
HCV S/CO RATIO: 0.21 S/CO
HDLC SERPL-MCNC: 90 MG/DL
HGB BLD-MCNC: 13.2 G/DL
HIV1+2 AB SPEC QL IA.RAPID: NONREACTIVE
IMM GRANULOCYTES NFR BLD AUTO: 0.2 %
IRON SERPL-MCNC: 60 UG/DL
KETONES URINE: NEGATIVE MG/DL
LDLC SERPL CALC-MCNC: 124 MG/DL
LEUKOCYTE ESTERASE URINE: NEGATIVE
LYMPHOCYTES # BLD AUTO: 0.62 K/UL
LYMPHOCYTES NFR BLD AUTO: 14 %
MAN DIFF?: NORMAL
MCHC RBC-ENTMCNC: 30.8 GM/DL
MCHC RBC-ENTMCNC: 31 PG
MCV RBC AUTO: 100.5 FL
MICROSCOPIC-UA: NORMAL
MONOCYTES # BLD AUTO: 0.43 K/UL
MONOCYTES NFR BLD AUTO: 9.7 %
NEUTROPHILS # BLD AUTO: 3.27 K/UL
NEUTROPHILS NFR BLD AUTO: 74 %
NITRITE URINE: NEGATIVE
NONHDLC SERPL-MCNC: 137 MG/DL
PH URINE: 7
PLATELET # BLD AUTO: 259 K/UL
POTASSIUM SERPL-SCNC: 4.4 MMOL/L
PROT SERPL-MCNC: 6.9 G/DL
PROTEIN URINE: NORMAL MG/DL
RBC # BLD: 4.26 M/UL
RBC # FLD: 13.5 %
RED BLOOD CELLS URINE: 23 /HPF
S PYO AG SPEC QL IA: NORMAL
SARS-COV-2 AB SERPL IA-ACNC: 202 U/ML
SARS-COV-2 AB SERPL QL IA: 55.2 INDEX
SODIUM SERPL-SCNC: 139 MMOL/L
SPECIFIC GRAVITY URINE: 1.02
TRIGL SERPL-MCNC: 73 MG/DL
TSH SERPL-ACNC: 0.78 UIU/ML
UROBILINOGEN URINE: 0.2 MG/DL
VIT B12 SERPL-MCNC: 368 PG/ML
WBC # FLD AUTO: 4.42 K/UL
WHITE BLOOD CELLS URINE: 0 /HPF

## 2024-06-03 NOTE — PROGRESS NOTE ADULT - ASSESSMENT
37/F  currently 17 weeks pregnant with sepsis likely due to chorioamnionitis. No other obvious source.   Blood culture  identified peptoniphilus x 2.   Has cleared bacteremia.  For US.  Would continue Zosyn q 8 hours. electronic

## 2025-01-08 ENCOUNTER — NON-APPOINTMENT (OUTPATIENT)
Age: 45
End: 2025-01-08

## 2025-01-08 ENCOUNTER — APPOINTMENT (OUTPATIENT)
Dept: ORTHOPEDIC SURGERY | Facility: CLINIC | Age: 45
End: 2025-01-08
Payer: COMMERCIAL

## 2025-01-08 VITALS — HEIGHT: 63.5 IN | BODY MASS INDEX: 18.72 KG/M2 | WEIGHT: 107 LBS

## 2025-01-08 DIAGNOSIS — S96.912A STRAIN OF UNSPECIFIED MUSCLE AND TENDON AT ANKLE AND FOOT LEVEL, LEFT FOOT, INITIAL ENCOUNTER: ICD-10-CM

## 2025-01-08 DIAGNOSIS — M25.579 PAIN IN UNSPECIFIED ANKLE AND JOINTS OF UNSPECIFIED FOOT: ICD-10-CM

## 2025-01-08 DIAGNOSIS — S93.492A SPRAIN OF OTHER LIGAMENT OF LEFT ANKLE, INITIAL ENCOUNTER: ICD-10-CM

## 2025-01-08 PROCEDURE — 73610 X-RAY EXAM OF ANKLE: CPT | Mod: LT

## 2025-01-08 PROCEDURE — 99203 OFFICE O/P NEW LOW 30 MIN: CPT

## 2025-01-08 RX ORDER — MELOXICAM 15 MG/1
15 TABLET ORAL DAILY
Qty: 30 | Refills: 0 | Status: ACTIVE | COMMUNITY
Start: 2025-01-08 | End: 1900-01-01

## 2025-03-18 ENCOUNTER — APPOINTMENT (OUTPATIENT)
Dept: ORTHOPEDIC SURGERY | Facility: CLINIC | Age: 45
End: 2025-03-18

## 2025-03-21 ENCOUNTER — APPOINTMENT (OUTPATIENT)
Dept: ORTHOPEDIC SURGERY | Facility: CLINIC | Age: 45
End: 2025-03-21
Payer: COMMERCIAL

## 2025-03-21 DIAGNOSIS — S90.454A SUPERFICIAL FOREIGN BODY, RIGHT LESSER TOE(S), INITIAL ENCOUNTER: ICD-10-CM

## 2025-03-21 PROCEDURE — 28190 REMOVAL OF FOOT FOREIGN BODY: CPT | Mod: T9

## 2025-03-21 PROCEDURE — 64450 NJX AA&/STRD OTHER PN/BRANCH: CPT | Mod: 59,RT

## 2025-03-21 PROCEDURE — 99213 OFFICE O/P EST LOW 20 MIN: CPT | Mod: 25

## 2025-03-26 NOTE — DISCHARGE NOTE ANTEPARTUM - CARE PLAN
.
Principal Discharge DX:	Fever of unknown origin (FUO)  Goal:	Continue prenatal care  Assessment and plan of treatment:	Followup with OB as scheduled. Continue [place] weekly. Call office with any concerns.

## 2025-06-18 NOTE — PROVIDER CONTACT NOTE (OTHER) - DATE AND TIME:
Refill request    Disp Refills Start End    propRANolol (INDERAL) 20 MG tablet 270 tablet 0 3/10/2025 --    Si mg in am, 30 mg in the pm.    Patient taking differently: 20 mg in am, 30 mg in the pm, and 10mg at bedtime    Sent to pharmacy as: Propranolol HCl 20 MG Oral Tablet (INDERAL)    Class: Eprescribe    E-Prescribing Status: Receipt confirmed by pharmacy (3/10/2025 11:16 AM CDT)      LOV 3/10/25 f/u scheduled 25  LOV notes \"I have suggested she adjust the propranolol so she takes 20 mg in am, 30 mg in pm\".     Refills approved    12-Jun-2018 20:50

## 2025-09-10 ENCOUNTER — NON-APPOINTMENT (OUTPATIENT)
Age: 45
End: 2025-09-10

## 2025-09-10 RX ORDER — NITROFURANTOIN (MONOHYDRATE/MACROCRYSTALS) 25; 75 MG/1; MG/1
100 CAPSULE ORAL TWICE DAILY
Qty: 14 | Refills: 0 | Status: ACTIVE | COMMUNITY
Start: 2025-09-10 | End: 1900-01-01

## 2025-09-10 RX ORDER — PHENAZOPYRIDINE 100 MG/1
100 TABLET, FILM COATED ORAL 3 TIMES DAILY
Qty: 15 | Refills: 0 | Status: ACTIVE | COMMUNITY
Start: 2025-09-10 | End: 1900-01-01

## 2025-09-13 ENCOUNTER — TRANSCRIPTION ENCOUNTER (OUTPATIENT)
Age: 45
End: 2025-09-13

## 2025-09-13 LAB — BACTERIA UR CULT: ABNORMAL

## 2025-09-15 LAB
APPEARANCE: CLEAR
BACTERIA: ABNORMAL /HPF
BILIRUBIN URINE: NEGATIVE
BLOOD URINE: ABNORMAL
CAST: 2 /LPF
COLOR: YELLOW
EPITHELIAL CELLS: 4 /HPF
GLUCOSE QUALITATIVE U: NEGATIVE MG/DL
KETONES URINE: NEGATIVE MG/DL
LEUKOCYTE ESTERASE URINE: ABNORMAL
MICROSCOPIC-UA: NORMAL
NITRITE URINE: POSITIVE
PH URINE: 7
PROTEIN URINE: 30 MG/DL
RED BLOOD CELLS URINE: 9 /HPF
REVIEW: NORMAL
SPECIFIC GRAVITY URINE: 1.02
UROBILINOGEN URINE: 0.2 MG/DL
WHITE BLOOD CELLS URINE: 373 /HPF